# Patient Record
Sex: FEMALE | Race: WHITE | Employment: OTHER | URBAN - METROPOLITAN AREA
[De-identification: names, ages, dates, MRNs, and addresses within clinical notes are randomized per-mention and may not be internally consistent; named-entity substitution may affect disease eponyms.]

---

## 2022-05-19 ENCOUNTER — OFFICE VISIT (OUTPATIENT)
Dept: ORTHOPEDIC SURGERY | Age: 57
End: 2022-05-19
Payer: COMMERCIAL

## 2022-05-19 ENCOUNTER — HOSPITAL ENCOUNTER (OUTPATIENT)
Dept: ORTHOPEDIC SURGERY | Age: 57
Discharge: HOME | End: 2022-05-21
Payer: COMMERCIAL

## 2022-05-19 VITALS
HEIGHT: 66 IN | OXYGEN SATURATION: 97 % | BODY MASS INDEX: 33.43 KG/M2 | HEART RATE: 88 BPM | WEIGHT: 208 LBS | TEMPERATURE: 97.4 F

## 2022-05-19 DIAGNOSIS — M25.522 LEFT ELBOW PAIN: ICD-10-CM

## 2022-05-19 DIAGNOSIS — S42.402A CLOSED FRACTURE DISLOCATION OF LEFT ELBOW, INITIAL ENCOUNTER: Primary | ICD-10-CM

## 2022-05-19 PROCEDURE — G8427 DOCREV CUR MEDS BY ELIG CLIN: HCPCS | Performed by: ORTHOPAEDIC SURGERY

## 2022-05-19 PROCEDURE — 4004F PT TOBACCO SCREEN RCVD TLK: CPT | Performed by: ORTHOPAEDIC SURGERY

## 2022-05-19 PROCEDURE — 73080 X-RAY EXAM OF ELBOW: CPT | Performed by: ORTHOPAEDIC SURGERY

## 2022-05-19 PROCEDURE — 99204 OFFICE O/P NEW MOD 45 MIN: CPT | Performed by: ORTHOPAEDIC SURGERY

## 2022-05-19 PROCEDURE — 3017F COLORECTAL CA SCREEN DOC REV: CPT | Performed by: ORTHOPAEDIC SURGERY

## 2022-05-19 PROCEDURE — 73080 X-RAY EXAM OF ELBOW: CPT

## 2022-05-19 PROCEDURE — G8417 CALC BMI ABV UP PARAM F/U: HCPCS | Performed by: ORTHOPAEDIC SURGERY

## 2022-05-19 RX ORDER — ALPRAZOLAM 0.5 MG/1
TABLET, EXTENDED RELEASE ORAL
COMMUNITY
Start: 2022-05-04

## 2022-05-19 RX ORDER — ACETAMINOPHEN 500 MG
500 TABLET ORAL EVERY 6 HOURS PRN
COMMUNITY

## 2022-05-19 NOTE — PROGRESS NOTES
Subjective:      Patient ID: Jane Vela is a 61 y.o. female who presents today for:  Chief Complaint   Patient presents with    Elbow Injury     inj[place] her elbow in [place] but didn't get it looked at until she was in [address] fell and hurt her left elbow, but decided to go to [facility], because the pain was so severe she went to the emergency room that was close to 2 weeks after her injury where she was found to have a fracture/dislocation of her left elbow that was treated by an orthopedic surgeon down in [place]. Thus my knowledge the surgery was a ORIF of her olecranon with a reduction of her elbow and an application of a internal hinge. No past medical history on file. No past surgical history on file. Social History     Socioeconomic History    Marital status:      Spouse name: Not on file    Number of children: Not on file    Years of education: Not on file    Highest education level: Not on file   Occupational History    Not on file   Tobacco Use    Smoking status: Never Smoker    Smokeless tobacco: Not on file   Vaping Use    Vaping Use: Never used   Substance and Sexual Activity    Alcohol use: Yes     Alcohol/week: 0.0 standard drinks     Comment: 1 glass per week    Drug use: No    Sexual activity: Not on file   Other Topics Concern    Not on file   Social History Narrative    Not on file     Social Determinants of Health     Financial Resource Strain:     Difficulty of Paying Living Expenses: Not on file   Food Insecurity:     Worried About Running Out of Food in the Last Year: Not on file    Ari of Food in the Last Year: Not on file   Transportation Needs:     Lack of Transportation (Medical): Not on file    Lack of Transportation (Non-Medical):  Not on file   Physical Activity:     Days of Exercise per Week: Not on file    Minutes of Exercise per Session: Not on file   Stress:     Feeling of Stress : Not on file   Social Connections:     Frequency of Communication with Friends and Family: Not on file    Frequency of Social Gatherings with Friends and Family: Not on file    Attends Evangelical Services: Not on file    Active Member of Clubs or Organizations: Not on file    Attends Club or Organization Meetings: Not on file    Marital Status: Not on file   Intimate Partner Violence:     Fear of Current or Ex-Partner: Not on file    Emotionally Abused: Not on file    Physically Abused: Not on file    Sexually Abused: Not on file   Housing Stability:     Unable to Pay for Housing in the Last Year: Not on file    Number of [place] in the Last Year: Not on file    Unstable Housing in the Last Year: Not on file     Allergies   Allergen Reactions    Penicillins Anaphylaxis     Current Outpatient Medications on File Prior to Visit   Medication Sig Dispense Refill    acetaminophen (TYLENOL) 500 MG tablet Take 500 mg by mouth every 6 hours as needed for Pain      ALPRAZolam (XANAX XR) 0.5 MG extended release tablet TAKE 1 TABLET BY MOUTH EVERY 8 HOURS AS NEEDED FOR ANXIETY. DO NOT CRUSH, CHEW, SPLIT       No current facility-administered medications on file prior to visit. Review of Systems    Objective:   Pulse 88   Temp 97.4 °F (36.3 °C) (Temporal)   Ht 5' 6\" (1.676 m)   Wt 208 lb (94.3 kg)   SpO2 97%   BMI 33.57 kg/m²   Ortho Exam   Examination demonstrates well-perfused digits, a oblique type incision over the elbow with multiple staples that were removed and Steri-Stripped, she is not able to flex to 90 degrees and stops at approximately 70. Radiographs and Laboratory Studies:     Diagnostic Imaging Studies:    XR ELBOW LEFT (MIN 3 VIEWS)    Result Date: 5/19/2022  The lateral oblique views taken of the left elbow demonstrates what appears to be hardware involving the left elbow consistent with an ulnar plate as well as possibly a internal/external fixator.   There is not appear to be any fracture that is displaced nor is

## 2022-05-23 ENCOUNTER — OFFICE VISIT (OUTPATIENT)
Dept: ORTHOPEDIC SURGERY | Age: 57
End: 2022-05-23
Payer: COMMERCIAL

## 2022-05-23 VITALS
TEMPERATURE: 98.4 F | HEIGHT: 66 IN | BODY MASS INDEX: 33.43 KG/M2 | WEIGHT: 208 LBS | OXYGEN SATURATION: 98 % | HEART RATE: 78 BPM

## 2022-05-23 DIAGNOSIS — S42.402A CLOSED FRACTURE DISLOCATION OF LEFT ELBOW, INITIAL ENCOUNTER: Primary | ICD-10-CM

## 2022-05-23 PROCEDURE — 3017F COLORECTAL CA SCREEN DOC REV: CPT | Performed by: ORTHOPAEDIC SURGERY

## 2022-05-23 PROCEDURE — 99214 OFFICE O/P EST MOD 30 MIN: CPT | Performed by: ORTHOPAEDIC SURGERY

## 2022-05-23 PROCEDURE — G8417 CALC BMI ABV UP PARAM F/U: HCPCS | Performed by: ORTHOPAEDIC SURGERY

## 2022-05-23 PROCEDURE — G8427 DOCREV CUR MEDS BY ELIG CLIN: HCPCS | Performed by: ORTHOPAEDIC SURGERY

## 2022-05-23 PROCEDURE — 4004F PT TOBACCO SCREEN RCVD TLK: CPT | Performed by: ORTHOPAEDIC SURGERY

## 2022-05-23 NOTE — PROGRESS NOTES
Subjective:      Patient ID: Aminah Fajardo is a 61 y.o. female who presents today for:  Chief Complaint   Patient presents with   [facility] Consult     Surgery May 3rd. Pt states each day is getting better. HPI  Patient essentially sustained a subacute fracture dislocation of her left elbow and ultimately underwent internal stabilization in the form of IGS hinged elbow fixator. Patient states that she has been feeling better and has been following appropriate precautions in regards to no lifting pulling or pushing with the left upper extremity. Denies any new trauma or falls. Denies any wound issues at this time. No past medical history on file. No past surgical history on file. Social History     Socioeconomic History    Marital status:      Spouse name: Not on file    Number of children: Not on file    Years of education: Not on file    Highest education level: Not on file   Occupational History    Not on file   Tobacco Use    Smoking status: Never Smoker    Smokeless tobacco: Not on file   Vaping Use    Vaping Use: Never used   Substance and Sexual Activity    Alcohol use: Yes     Alcohol/week: 0.0 standard drinks     Comment: 1 glass per week    Drug use: No    Sexual activity: Not on file   Other Topics Concern    Not on file   Social History Narrative    Not on file     Social Determinants of Health     Financial Resource Strain:     Difficulty of Paying Living Expenses: Not on file   Food Insecurity:     Worried About Running Out of Food in the Last Year: Not on file    Ari of Food in the Last Year: Not on file   Transportation Needs:     Lack of Transportation (Medical): Not on file    Lack of Transportation (Non-Medical):  Not on file   Physical Activity:     Days of Exercise per Week: Not on file    Minutes of Exercise per Session: Not on file   Stress:     Feeling of Stress : Not on file   Social Connections:     Frequency of Communication with Friends and Family: Not on file    Frequency of Social Gatherings with Friends and Family: Not on file    Attends Anglican Services: Not on file    Active Member of Clubs or Organizations: Not on file    Attends Club or Organization Meetings: Not on file    Marital Status: Not on file   Intimate Partner Violence:     Fear of Current or Ex-Partner: Not on file    Emotionally Abused: Not on file    Physically Abused: Not on file    Sexually Abused: Not on file   Housing Stability:     Unable to Pay for Housing in the Last Year: Not on file    Number of [place] in the Last Year: Not on file    Unstable Housing in the Last Year: Not on file     No family history on file. Allergies   Allergen Reactions    Penicillins Anaphylaxis     Current Outpatient Medications on File Prior to Visit   Medication Sig Dispense Refill    acetaminophen (TYLENOL) 500 MG tablet Take 500 mg by mouth every 6 hours as needed for Pain      ALPRAZolam (XANAX XR) 0.5 MG extended release tablet TAKE 1 TABLET BY MOUTH EVERY 8 HOURS AS NEEDED FOR ANXIETY. DO NOT CRUSH, CHEW, SPLIT (Patient not taking: Reported on 5/23/2022)       No current facility-administered medications on file prior to visit. Review of Systems      Objective:   Pulse 78   Temp 98.4 °F (36.9 °C) (Temporal)   Ht 5' 6\" (1.676 m)   Wt 208 lb (94.3 kg)   SpO2 98%   BMI 33.57 kg/m²     Ortho Exam  Left upper extremity: Patient has a well-healed posterior lateral surgical incision about the left elbow. Patient has Steri-Strips in place with no evidence of erythema, drainage or dehiscence. Patient tolerates some gentle range of motion of the elbow. Patient able obtain approximately 20 degrees from full extension and flexion to approximately 90. No feeling of subluxation or dislocation with gentle manipulation of the elbow including pronation or supination. Radial, ulnar and median nerves are functioning appropriately distally.     Radiographs and Laboratory Studies:     Diagnostic Imaging Studies:    AP, lateral and oblique imaging of the left elbow from 5/19/2022 was independently reviewed    Radiographs demonstrate concentric reduction of the ulnohumeral joint with no evidence of radiocapitellar subluxation dislocation with IGS internal elbow stabilizer in appropriate position. Laboratory Studies:   No results found for: WBC, HGB, HCT, MCV, PLT  No results found for: SEDRATE  No results found for: CRP    Assessment:       Diagnosis Orders   1. Closed fracture dislocation of left elbow, initial encounter  Ambulatory referral to Physical Therapy          Plan:     Placement of IGS device to the left elbow and subsequent relocation of the ulnohumeral joint appears to be adequate. Recommend beginning physical therapy to focus on range of motion specifically in terms of flexion and extension of the elbow. There will be an upper limit to the maximum amount of motion that she will be able to obtain with this brace in place however will allow for motion to begin currently. Patient made aware that ultimately this device will need to come out and as such will require a second surgery. Patient will almost certainly lose several degrees of extension as well as several degrees of flexion as a result of this injury and subsequent treatment to correct. Fortunately, patient underwent appropriate care and elbow is adequately stabilized. We will see patient back in 6 weeks with repeat 2 view of the left elbow.     Orders Placed This Encounter   Procedures    Ambulatory referral to Physical Therapy     Referral Priority:   Routine     Referral Type:   Eval and Treat     Referral Reason:   Specialty Services Required     Requested Specialty:   Physical Therapist     Number of Visits Requested:   1     Jamie Duron MD

## 2022-06-06 ENCOUNTER — HOSPITAL ENCOUNTER (OUTPATIENT)
Dept: PHYSICAL THERAPY | Age: 57
Setting detail: THERAPIES SERIES
Discharge: HOME | End: 2022-06-06
Payer: COMMERCIAL

## 2022-06-06 PROCEDURE — 97110 THERAPEUTIC EXERCISES: CPT

## 2022-06-06 PROCEDURE — 97162 PT EVAL MOD COMPLEX 30 MIN: CPT

## 2022-06-06 ASSESSMENT — PAIN - FUNCTIONAL ASSESSMENT: PAIN_FUNCTIONAL_ASSESSMENT: PREVENTS OR INTERFERES WITH ALL ACTIVE AND SOME PASSIVE ACTIVITIES

## 2022-06-06 ASSESSMENT — PAIN DESCRIPTION - ORIENTATION: ORIENTATION: LEFT

## 2022-06-06 ASSESSMENT — PAIN DESCRIPTION - LOCATION: LOCATION: ELBOW

## 2022-06-06 ASSESSMENT — PAIN SCALES - GENERAL: PAINLEVEL_OUTOF10: 0

## 2022-06-06 ASSESSMENT — PAIN DESCRIPTION - DESCRIPTORS: DESCRIPTORS: DISCOMFORT

## 2022-06-06 NOTE — PROGRESS NOTES
[facility]  PHYSICAL THERAPY EVALUATION      Physical Therapy: Initial Evaluation    Patient: Brigido Melgar (72 y.o.     female)   Examination Date:   Plan of Care Certification Period: 2022 to    Progress Note Counter:    :  1965 ;    Confirmed: Yes MRN: [id number]  CSN: [id number]   Insurance: Payor: [employer] / Plan: Event[employer]amalon Bebo / Product Type: *No Product type* /   Insurance ID: [id number] - (Commercial) Secondary Insurance (if applicable): [place] Gerardo 9881 *   Referring Physician: Wilmer Thurston MD     PCP: Deangelo Lozano MD Visits to Date/Visits Approved:  (Eval +20V)    No Show/Cancelled Appts: 0  0     Medical Diagnosis: Unspecified fracture of lower end of left humerus, initial encounter for closed fracture [S42.402A]    Treatment Diagnosis: decreased L shoulder, elbow, and wrist AROM, decreased L UE and hand strength, impaired fine motor control, impaired functional activity tolerance, L UE pain and edema     PERTINENT MEDICAL HISTORY   Patient Assessed for Rehabilitation Services: Yes       Medical History: Chart Reviewed: Yes History reviewed. No pertinent past medical history. Surgical History: History reviewed. No pertinent surgical history. Medications:   Current Outpatient Medications:     ALPRAZolam (XANAX XR) 0.5 MG extended release tablet, TAKE 1 TABLET BY MOUTH EVERY 8 HOURS AS NEEDED FOR ANXIETY. DO NOT CRUSH, CHEW, SPLIT (Patient not taking: Reported on 2022), Disp: , Rfl:     acetaminophen (TYLENOL) 500 MG tablet, Take 500 mg by mouth every 6 hours as needed for Pain, Disp: , Rfl:   Allergies: Penicillins      SUBJECTIVE EXAMINATION     History obtained from[de-identified] Patient,      Family/Caregiver Present: Yes ()    Subjective History:  Onset Date: 22  Subjective: Pts  reports she fell tripping over a rug and landed on L elbow fracturing it and dislocating it. Had reconstrucive surgery 5/3/22 (one week after her fall). Her surgery included internal fixation with a joint stabilizer and stiffner that will limit some of her mobility. Pt reports she has been doing light activity around the house and was told to do very minimal activities from MD. Pt reports her pain has been minimal though takes a Tylenol as needed daily. Additional Pertinent Hx (if applicable):     Previous treatments prior to current episode?: Surgery  Any changes to allergies, medications, or have you had any medical procedures/ER visits since your last visit?: No  Comment: Per script \" Pt with complex L elbow reconstruction. Okay for ROM in both flexion, extension, supination and pronation however no strength training at this time. \"  Comments: RTD 7/11/22      Learning/Language: Learning  Does the patient/guardian have any barriers to learning?: No barriers  Will there be a co-learner?: No  What is the preferred language of the patient/guardian?: English  Is an  required?: No     Pain Screening    Pain Screening  Patient Currently in Pain: Yes  Pain Assessment: 0-10  Pain Level: 0  Best Pain Level: 0  Worst Pain Level: 3  Pain Location: Elbow  Pain Orientation: Left  Pain Descriptors: Discomfort  Functional Pain Assessment: Prevents or interferes with all active and some passive activities  Pain Management/Relieving Factors: Medications    Functional Status    Social History:    Social History  Lives With: Spouse    Occupation/Interests:   Occupation: Retired  Leisure & Hobbies: swimming, walking, shopping, gardening, bike riding, water aerobics    Prior Level of Function:   100% Independent     Current Level of Function:   75%      IADL Comments: diffciulty with fine motor movements such as doing buttons, zippers, tying shoes, opening jars, and using curling iron  ADL Assistance: [facility]: Independent  Homemaking Responsibilities: Yes  Ambulation Assistance: Independent  Transfer Assistance: Independent  Active : Yes    Dominant Hand: : Right    OBJECTIVE EXAMINATION     Restrictions:         Position Activity Restriction  Other position/activity restrictions: No strengthening yet per MD    Review of Systems:  Vision: Impaired  Hearing: Within functional limits  Follows Commands: Within Functional Limits  Integumentary Assessment  Edema: Yes (L elbow edema)  Scar: L lateral elbow intact and healing no evidence of drainage  Skin Integrity : Surgical Incision Dry    Observations:   General Observations  Description: rounded shoudlers, limited elbow extension    Left AROM  Right AROM         AROM LUE (degrees)  L Shoulder Flexion 0-180: 114  L Shoulder ABduction 0-180: 70  L Shoulder Int Rotation  0-70: limited d/t elbow mobility  L Shoulder Ext Rotation  0-90: limited d/t elbow mobility  L Elbow Flexion 0-145: 85*  L Elbow Extension 145-0: -60 from neutral  L Forearm Pron 0-90: WNL's  L Forearm Supination  0-90: 20  L Wrist Flexion 0-80: 63  L Wrist Extension 0-70: 10  L Wrist Radial Deviation 0-20: 3  L Wrist Ulnar Deviation 0-45: 48    AROM RUE (degrees)  R Shoulder Flexion 0-180: 163  R Shoulder ABduction 0-180: 160  R Shoulder Int Rotation  0-70: T9  R Shoulder Ext Rotation 0-90: T3  R Elbow Flexion 0-145: 140  R Elbow Extension 145-0: 8* of hyperextension  R Wrist Flexion 0-80: 63  R Wrist Extension 0-70: 73  R Wrist Radial Deviation 0-20: 38  R Wrist Ulnar Deviation 0-45: 38      Left Strength  Right Strength      Strength LUE  L Shoulder Flexion: 3-/5  L Shoulder ABduction: 3-/5  L Shoulder Internal Rotation: 3-/5  L Shoulder External Rotation: 3-/5  L Elbow Flexion: NT  L Elbow Extension: NT  L Forearm Pron: NT  L Forearm Sup: NT  L Wrist Extension: 2-/5  L Wrist Radial Deviation: 2-/5  L Wrist Ulnar Deviation: 3/5 Strength RUE  R Shoulder Flexion: 4/5  R Shoulder ABduction: 4/5  R Shoulder Internal Rotation: 5/5  R Shoulder External Rotation: 5/5  R Elbow Flexion: 5/5  R Elbow Extension: 5/5  R Forearm Pron: 5/5  R Forearm Sup: 5/5  R Wrist Flexion: 5/5  R Wrist Extension: 5/5  R Wrist Radial Deviation: 4/5  R Wrist Ulnar Deviation: 4/5     Outcomes Score:  Exam: UEFI 39/80    Treatment:    Exercises:   Exercises  Exercise 1: wrist AROM all planes x10  Exercise 2: forearm promation/supination 5\"x10  Exercise 3: table slides flex/scaption 5\"x10  Exercise 4: pulleys*  Exercise 5: finger ladder*  Exercise 6: elbow AAROM flexion/eextension 5\"x10  Exercise 7: supine elbow extension stretch*  Exercise 8: putty squeeze, pinch/pull*  Exercise 9: power web*  Exercise 10: digiflex*  Exercise 11: rubberband on wide end of cone placement*  Exercise 12: motor planning wire*  Exercise 13: L elbow, shoulder, wrist PROM*  Exercise 20: HEP: elbow AAROM, wrist AROM, forearm pron/supination, table slides  Treatment Reasoning  Limitations addressed: Mobility  Modalities:  Modalities  Modalities:  (CP/MHP PRN, no Estim or U/S)     Manual:  Manual Therapy  Joint Mobilization: No elbow joint mobs  Soft Tissue Mobilizaton: L elbow scar mobs*     *Indicates exercise,modality, or manual techniques to be initiated when appropriate    ASSESSMENT     Impression: Assessment: Pt presents s/p L elbow ORIF 5/3/22 after a fall landing on her L elbow resulting in dislocation and fx. She currently presents with decreased L shoulder, elbow, and wrist AROM, decreased L UE and hand strength, impaired fine motor control, impaired functional activity tolerance, L UE pain and edema. These impairments currently limit her functional abilities to perform ADL's, household duties, lift, carry, reach, push, pull, or sleep without limitations at PLOF.     Body Structures, Functions, Activity Limitations Requiring Skilled Therapeutic Intervention: Decreased functional mobility ,Decreased ADL status,Decreased ROM,Decreased strength,Increased pain,Decreased posture,Decreased high-level IADLs,Decreased fine motor control,Decreased coordination    Statement of Medical Necessity: Physical Therapy is both indicated and medically necessary as outlined in the POC to increase the likelihood of meeting the functionally related goals stated below.      Patient's Activity Tolerance: Patient tolerated evaluation without incident      Patient's rehabilitation potential/prognosis is considered to be: Good,Fair    Factors which may impact rehabilitation potential include: Medical co-morbidities     Patient Education: PT Education: Anna Doshi of Care,Evaluative findings,Home Exercise Program  Patient Education: Provided with light compression sleeve to assist with elbow edema     GOALS   Patient Goal(s): Patient goals : \"Full motion of elbow, weakness remedy\"    Short Term Goals Completed by 3 weeks Goal Status   STG 1The pt will have decreased L elbow pain </= 1/10 in order to increase ease with ADL's New     Long Term Goals Completed by 8 weeks Goal Status   LTG 1 The pt will be indep/compliant with HEP in order to self-manage symptoms upon D/C New   LTG 2 The pt will have a increase in UEFI score >/=60/80 points in order to increase functional activity tolerance New   LTG 3 The pt will demo improved L shoulder and wrist AROM WNL's L elbow AROM >/=-* in order to increase ease with ADL's New   LTG 4 The pt will demonstrate improved L UE strength >/=4/5 in order to lift/carry with decreased pain (when able to perform strengthening per MD) New   LTG 5 The pt will demo improved L  and pinch strength WNL's of the R in order to increase ease with opening jars and driving when appropriate New     TREATMENT PLAN       Treatment Initiated : ther ex and HEP    Treatment may include any combination of the following: Strengthening,ROM,Neuromuscular re-education,Manual Therapy - Soft Tissue Mobilization,Modalities,Patient/Caregiver education & training,Home exercise program,Therapeutic activities     Frequency / Duration:  Patient to be seen 1-2 times per week for 8 weeks  Plan Comment: Provided with light compression sleeve to assist with elbow edema  Start 2x per week and decrease to 1x as able with improved ROM          Eval Complexity:   Decision Making: Medium Complexity  History: Personal Factors and/or Comorbidities Impacting POC: Medium  Examination of body system(s) including body structures and functions, activity limitations, and/or participation restrictions: High  Exam: UEFI 39/80  Clinical Presentation: Medium    POST-PAIN     Pain Rating (0-10 pain scale):  0 /10 \"numb\"  Location and pain description same as pre-treatment unless indicated. Action: [] NA  [] Call Physician  [x] Perform HEP  [] Meds as prescribed    Evaluation and patient rights have been reviewed and patient agrees with plan of care. Yes  [x]  No  []   Explain:     Ifrah Fall Risk Assessment  Risk Factor Scale  Score   History of Falls [x] Yes  [] No 25  0    Secondary Diagnosis [] Yes  [x] No 15  0    Ambulatory Aid [] Furniture  [] Crutches/cane/walker  [x] None/bedrest/wheelchair/nurse 30  15  0    IV/Heparin Lock [] Yes  [x] No 20  0    Gait/Transferring [] Impaired  [] Weak  [x] Normal/bedrest/immobile 20  10  0    Mental Status [] Forgets limitations  [x] Oriented to own ability 15  0       Total: 25     Based on the Assessment score: check the appropriate box.   []  No intervention needed   Low =   Score of 0-24  [x]  Use standard prevention interventions Moderate =  Score of 24-44   [x] Discuss fall prevention strategies   [x] Indicate moderate falls risk on eval  []  Use high risk prevention interventions High = Score of 45 and higher   [] Discuss fall prevention strategies   [] Provide supervision during treatment time    Minutes:  PT Individual Minutes  Time In: 9101  Time Out: 0950  Minutes: 55  Timed Code Treatment Minutes: 15 Minutes  Procedure Minutes: 40 eval      Timed Activity Minutes Units   Ther Ex 15 1     Electronically signed by Zachary Whitaker

## 2022-06-06 NOTE — PROGRESS NOTES
[address]  PHYSICAL THERAPY PLAN OF CARE   [address], [address] [facility]         Ph: [phone]  Fax: [phone]    [] Certification  [] Recertification [x]  Plan of Care  [] Progress Note [] Discharge      Referring Provider: Deven Barclay MD     From:  Moi Zavaleta, PT   Patient: Randall Horn (32 y.o. female) : 1965 Date: 2022   Medical Diagnosis: Unspecified fracture of lower end of left humerus, initial encounter for closed fracture [S42.402A]    Treatment Diagnosis: decreased L shoulder, elbow, and wrist AROM, decreased L UE and hand strength, impaired fine motor control, impaired functional activity tolerance, L UE pain and edema  Progress Report Period from:  2022  to 2022    Visits to Date: 1 No Show: 0 Cancelled Appts: 0    OBJECTIVE:   Short Term Goals - Time Frame for Short term goals: 3 weeks    Goals Current/Discharge status  Status   Short term goal 1: The pt will have decreased L elbow pain </= 1/10 in order to increase ease with ADL's  Pain Screening  Patient Currently in Pain: Yes  Pain Assessment: 0-10  Pain Level: 0  Best Pain Level: 0  Worst Pain Level: 3  Pain Location: Elbow  Pain Orientation: Left  Pain Descriptors: Discomfort  Functional Pain Assessment: Prevents or interferes with all active and some passive activities  Pain Management/Relieving Factors: Medications New     Long Term Goals - Time Frame for Long term goals : 8 weeks  Goals Current/ Discharge status Status   Long term goal 1: The pt will be indep/compliant with HEP in order to self-manage symptoms upon D/C ongoing New   Long term goal 2: The pt will have a increase in UEFI score >/=60/80 points in order to increase functional activity tolerance 39/80 New   Long term goal 3: The pt will demo improved L shoulder and wrist AROM WNL's L elbow AROM >/=-* in order to increase ease with ADL's AROM LUE (degrees)  L Shoulder Flexion 0-180: 114  L Shoulder ABduction 0-180: 70  L Shoulder Int Rotation  0-70: limited d/t elbow mobility  L Shoulder Ext Rotation  0-90: limited d/t elbow mobility  L Elbow Flexion 0-145: 85*  L Elbow Extension 145-0: -60 from neutral  L Forearm Pron 0-90: WNL's  L Forearm Supination  0-90: 20  L Wrist Flexion 0-80: 63  L Wrist Extension 0-70: 10  L Wrist Radial Deviation 0-20: 3  L Wrist Ulnar Deviation 0-45: 48 New   Long term goal 4: The pt will demonstrate improved L UE strength >/=4/5 in order to lift/carry with decreased pain (when able to perform strengthening per MD) Strength LUE  L Shoulder Flexion: 3-/5  L Shoulder ABduction: 3-/5  L Shoulder Internal Rotation: 3-/5  L Shoulder External Rotation: 3-/5  L Elbow Flexion: NT  L Elbow Extension: NT  L Forearm Pron: NT  L Forearm Sup: NT  L Wrist Extension: 2-/5  L Wrist Radial Deviation: 2-/5  L Wrist Ulnar Deviation: 3/5 New   Long term goal 5: The pt will demo improved L  and pinch strength WNL's of the R in order to increase ease with opening jars and driving when appropriate  strength L 6.6 # R 28.3 # (avergae of 3 2nd rung, elbow flexed); Pinch strength palmar grasp L 2# R 13# key hold L 6# R 11# New     Body Structures, Functions, Activity Limitations Requiring Skilled Therapeutic Intervention: Decreased functional mobility ,Decreased ADL status,Decreased ROM,Decreased strength,Increased pain,Decreased posture,Decreased high-level IADLs,Decreased fine motor control,Decreased coordination  Assessment: Pt presents s/p L elbow ORIF 5/3/22 after a fall landing on her L elbow resulting in dislocation and fx. She currently presents with decreased L shoulder, elbow, and wrist AROM, decreased L UE and hand strength, impaired fine motor control, impaired functional activity tolerance, L UE pain and edema. These impairments currently limit her functional abilities to perform ADL's, household duties, lift, carry, reach, push, pull, or sleep without limitations at PLOF.   Therapy Prognosis: Good,Fair    PT Education: Goals;PT Role;Plan of Care;Evaluative findings;Home Exercise Program  Patient Education: Provided with light compression sleeve to assist with elbow edema    PLAN: [x] Evaluate and Treat  Frequency/Duration:  Plan Frequency: 1-2  Plan weeks: 8  Current Treatment Recommendations: Strengthening,ROM,Neuromuscular re-education,Manual Therapy - Soft Tissue Mobilization,Modalities,Patient/Caregiver education & training,Home exercise program,Therapeutic activities  Plan Comment: Start 2x per week and decrease to 1x as able with improved ROM     Precautions: No strengthening per MD, mod falls risk                          Patient Status:[x] Continue/ Initiate plan of Care    [] Discharge PT. Recommend pt continue with HEP. [] Additional visits requested, Please re-certify for additional visits:    [] Hold         Signature: Electronically signed by Allison Retana PT on 6/6/22 at 10:10 AM EDT    If you have any questions or concerns, please don't hesitate to call. Thank you for your referral.    I have reviewed this plan of care and certify a need for medically necessary rehabilitation services.     Physician Signature:__________________________________________________________  Date:  Please sign and return

## 2022-06-09 ENCOUNTER — HOSPITAL ENCOUNTER (OUTPATIENT)
Dept: PHYSICAL THERAPY | Age: 57
Setting detail: THERAPIES SERIES
Discharge: HOME | End: 2022-06-09
Payer: COMMERCIAL

## 2022-06-09 PROCEDURE — 97140 MANUAL THERAPY 1/> REGIONS: CPT

## 2022-06-09 PROCEDURE — 97110 THERAPEUTIC EXERCISES: CPT

## 2022-06-09 NOTE — PROGRESS NOTES
[address]  PHYSICAL THERAPY PLAN OF CARE   [address], [address] [facility]         Ph: [phone]  Fax: [phone]    [] Certification  [] Recertification []  Plan of Care  [x] Progress Note [] Discharge      Referring Provider: Ehsan Francisco MD     From:  Sridhar De La Paz, PT   Patient: Corrine Fisher (1 y.o. female) : 1965 Date: 2022   Medical Diagnosis: Unspecified fracture of lower end of left humerus, initial encounter for closed fracture [S42.402A]    Treatment Diagnosis: decreased L shoulder, elbow, and wrist AROM, decreased L UE and hand strength, impaired fine motor control, impaired functional activity tolerance, L UE pain and edema    Progress Report Period from:  2022  to 2022    Visits to Date: 2 No Show: 0 Cancelled Appts: 0    OBJECTIVE:   Short Term Goals - Time Frame for Short term goals: 3 weeks    Goals Current/Discharge status  Status   Short term goal 1: The pt will have decreased L elbow pain </= 1/10 in order to increase ease with ADL's  0/10 this date  In progress     Long Term Goals - Time Frame for Long term goals : 8 weeks  Goals Current/ Discharge status Status   Long term goal 1: The pt will be indep/compliant with HEP in order to self-manage symptoms upon D/C ongoing In progress   Long term goal 2: The pt will have a increase in UEFI score >/=60/80 points in order to increase functional activity tolerance NT d/t PN completed for updated LTG 3 only  In progress   Long term goal 3: The pt will demo improved L shoulder and wrist AROM WNL's L elbow AROM >/=-* in order to increase ease with ADL's AROM LUE (degrees)  L Elbow Flexion 0-145: 102*  L Elbow Extension 145-0: -30 from neutral  Partially met,Updated   Long term goal 4: The pt will demonstrate improved L UE strength >/=4/5 in order to lift/carry with decreased pain (when able to perform strengthening per MD) NT d/t PN completed for updated LTG 3 only  In progress Long term goal 5: The pt will demo improved L  and pinch strength WNL's of the R in order to increase ease with opening jars and driving when appropriate NT d/t PN completed for updated LTG 3 only  In progress     Body Structures, Functions, Activity Limitations Requiring Skilled Therapeutic Intervention: Decreased functional mobility ,Decreased ADL status,Decreased ROM,Decreased strength,Increased pain,Decreased posture,Decreased high-level IADLs,Decreased fine motor control,Decreased coordination  Assessment: Pt demos significant improvement in L elbow AROM this date at the end of tx session meeting current goal, therefore updated goal to further improve mobility. Pt overall with good tolerance to tx though very emotional throughout d/t wanting to be back to normal and feeling frustrated. Pt with tightness noted throughout distal bicep and extensor wad musculature with moderate tenderness noted though able to tolerate STM with explanation of benefit. Therapy Prognosis: Good,Fair    PLAN:   Frequency/Duration:  Plan Frequency: 1-2  Plan weeks: 8  Current Treatment Recommendations: Strengthening,ROM,Neuromuscular re-education,Manual Therapy - Soft Tissue Mobilization,Modalities,Patient/Caregiver education & training,Home exercise program,Therapeutic activities  Plan Comment: Start 2x per week and decrease to 1x as able with improved ROM     Precautions: No strengthening per MD, mod falls risk                          Patient Status:[x] Continue/ Initiate plan of Care    [] Discharge PT. Recommend pt continue with HEP. [] Additional visits requested, Please re-certify for additional visits:    [] Hold         Signature: Electronically signed by Moi Zavaleta PT on 6/9/22 at 11:07 AM EDT    If you have any questions or concerns, please don't hesitate to call. Thank you for your referral.    I have reviewed this plan of care and certify a need for medically necessary rehabilitation services.     Physician Signature:__________________________________________________________  Date:  Please sign and return

## 2022-06-09 NOTE — PROGRESS NOTES
[facility]  Outpatient Physical Therapy    Treatment Note        Date: 2022  Patient: Rahel Tracy  : 1965   Confirmed: Yes  MRN: [id number]  Referring Provider: Gerard Cano MD   Secondary Referring Provider (If applicable):     Medical Diagnosis: Unspecified fracture of lower end of left humerus, initial encounter for closed fracture [S42.402A]    Treatment Diagnosis: decreased L shoulder, elbow, and wrist AROM, decreased L UE and hand strength, impaired fine motor control, impaired functional activity tolerance, L UE pain and edema    Visit Information:  Insurance: Payor: [employer] / Plan: Pumant / Product Type: *No Product type* /   PT Visit Information  Onset Date: 22  Total # of Visits Approved: 21 (Eval +20V)  Total # of Visits to Date: 2  No Show: 0  Canceled Appointment: 0  Progress Note Counter: -    Subjective Information:  Subjective: Pt denies pain currently, or soreness following evaluation. Has been having difficulty putting compression sleeve on therefore has not put back on since day of evaluation. Does have some \"discomfort\" with trying to daily activities. Has been compliant with HEP.   HEP Compliance:  [x] Good [] Fair [] Poor [] Reports not doing due to:    Treatment:  Exercises:  Exercises  Exercise 1: wrist AROM all planes x20, wrist extension stretches 30\"x3  Exercise 2: forearm pronation/supination 5\"x10  Exercise 3: table slides flex/scaption 5\"x10  Exercise 4: pulleys flexion x4 mins- scaption NV*  Exercise 5: finger ladder flex x5  Exercise 7: supine elbow extension stretch*  Exercise 8: putty squeeze, pinch/pull yellow x2 min each  Exercise 9: power web*  Exercise 10: digiflex*  Exercise 11: rubberband on wide end of cone placement*  Exercise 12: motor planning wire x5  Exercise 13: L elbow flex/ext, forearm supination, wrist flex/extension with gentle carpal distraction  Exercise 20: HEP: putty, wrist extension stretches  Treatment Reasoning  Limitations addressed: Mobility    Manual:   Manual Therapy  Soft Tissue Mobilizaton: L bicep and extensor wad with gentle PROM to the L elbow, forerm, and wrist (see ther ex for specifics) x10 mins total  Treatment Reasoning  Limitations addressed: Tissue extensibility,Joint motion    *Indicates exercise, modality, or manual techniques to be initiated when appropriate    Objective Measures:     Strength: [x] NT  [] MMT completed:    ROM: [] NT  [x] ROM measurements:  AROM LUE (degrees)  L Elbow Flexion 0-145: 102*  L Elbow Extension 145-0: -30 from neutral     Assessment: Body Structures, Functions, Activity Limitations Requiring Skilled Therapeutic Intervention: Decreased functional mobility ,Decreased ADL status,Decreased ROM,Decreased strength,Increased pain,Decreased posture,Decreased high-level IADLs,Decreased fine motor control,Decreased coordination  Assessment: Pt demos significant improvement in L elbow AROM this date at the end of tx session meeting current goal, therefore updated goal to further improve mobility. Pt overall with good tolerance to tx though very emotional throughout d/t wanting to be back to normal and feeling frustrated. Pt with tightness noted throughout distal bicep and extensor wad musculature with moderate tenderness noted though able to tolerate STM with explanation of benefit. Treatment Diagnosis: decreased L shoulder, elbow, and wrist AROM, decreased L UE and hand strength, impaired fine motor control, impaired functional activity tolerance, L UE pain and edema  Therapy Prognosis: Good,Fair     Patient Education: [x] NA    Post-Pain Assessment:       Pain Rating (0-10 pain scale):   0/10   Location and pain description same as pre-treatment unless indicated.    Action: [] NA   [x] Perform HEP  [] Meds as prescribed  [] Modalities as prescribed   [] Call Physician     GOALS   Patient Goal(s): Patient goals : \"Full motion of elbow, weakness remedy\"    Short Term Goals Completed by 3 weeks Goal Status   STG 1 The pt will have decreased L elbow pain </= 1/10 in order to increase ease with ADL's In progress     Long Term Goals Completed by 8 weeks Goal Status   LTG 1 The pt will be indep/compliant with HEP in order to self-manage symptoms upon D/C In progress   LTG 2 The pt will have a increase in UEFI score >/=60/80 points in order to increase functional activity tolerance In progress   LTG 3 The pt will demo improved L shoulder and wrist AROM WNL's L elbow AROM >/=-* in order to increase ease with ADL's Partially met,Updated   LTG 4 The pt will demonstrate improved L UE strength >/=4/5 in order to lift/carry with decreased pain (when able to perform strengthening per MD) In progress   LTG 5 The pt will demo improved L  and pinch strength WNL's of the R in order to increase ease with opening jars and driving when appropriate In progress     Plan:  Frequency/Duration:  Plan  Plan Frequency: 1-2  Plan weeks: 8  Current Treatment Recommendations: Strengthening,ROM,Neuromuscular re-education,Manual Therapy - Soft Tissue Mobilization,Modalities,Patient/Caregiver education & training,Home exercise program,Therapeutic activities  Plan Comment: Start 2x per week and decrease to 1x as able with improved ROM  Pt to continue current HEP. See objective section for any therapeutic exercise changes, additions or modifications this date.     Therapy Time:      PT Individual Minutes  Time In: 1003  Time Out: 6411  Minutes: 53  Timed Code Treatment Minutes: 53 Minutes  Procedure Minutes:0  Timed Activity Minutes Units   Ther Ex 43 3   Manual  10 1     Electronically signed by Moi Zavaleta PT on 6/9/22 at 11:05 AM EDT

## 2022-06-13 ENCOUNTER — HOSPITAL ENCOUNTER (OUTPATIENT)
Dept: PHYSICAL THERAPY | Age: 57
Setting detail: THERAPIES SERIES
Discharge: HOME | End: 2022-06-13
Payer: COMMERCIAL

## 2022-06-13 PROCEDURE — 97110 THERAPEUTIC EXERCISES: CPT

## 2022-06-13 ASSESSMENT — PAIN DESCRIPTION - LOCATION: LOCATION: ELBOW

## 2022-06-13 ASSESSMENT — PAIN SCALES - GENERAL: PAINLEVEL_OUTOF10: 3

## 2022-06-13 ASSESSMENT — PAIN DESCRIPTION - ORIENTATION: ORIENTATION: LEFT

## 2022-06-13 ASSESSMENT — PAIN DESCRIPTION - DESCRIPTORS: DESCRIPTORS: DISCOMFORT

## 2022-06-13 NOTE — PROGRESS NOTES
[address] [facility]  Outpatient Physical Therapy    Treatment Note        Date: 2022  Patient: Tahira Melagr  : 1965   Confirmed: Yes  MRN: [id number]  Referring Provider: Jewell Silva MD   Secondary Referring Provider (If applicable):     Medical Diagnosis: Unspecified fracture of lower end of left humerus, initial encounter for closed fracture [S42.402A]    Treatment Diagnosis: decreased L shoulder, elbow, and wrist AROM, decreased L UE and hand strength, impaired fine motor control, impaired functional activity tolerance, L UE pain and edema    Visit Information:  Insurance: Payor: [employer] / Plan: [employer] / Product Type: *No Product type* /   PT Visit Information  Onset Date: 22  Total # of Visits Approved: 21 (Eval +20V)  Total # of Visits to Date: 3  No Show: 0  Canceled Appointment: 0  Progress Note Counter: 3/8-    Subjective Information:  Subjective: Pt reports she felt \"really good, it helped a lot\" following last visit. Pt reports her elbow is a little more sore today from using it more at home. Pt states she was able to sweep her floor and put deodorant on using LUE yesterday and today.   HEP Compliance:  [x] Good [] Fair [] Poor [] Reports not doing due to:    Pain Screening  Patient Currently in Pain: Yes  Pain Assessment: 0-10  Pain Level: 3 (2-3)  Pain Location: Elbow  Pain Orientation: Left  Pain Descriptors: Discomfort    Treatment:  Exercises:  Exercises  Exercise 2: forearm pronation/supination 5\"x10  Exercise 3: table slides flex/scaption 5\"x10  Exercise 4: pulleys flexion, scaption x4 mins each  Exercise 5: finger ladder flex x5  Exercise 6: elbow AAROM flexion/eextension 5\"x10  Exercise 7: supine elbow extension stretch x3 mins - discussed for HEP  Exercise 10: digiflex yellow single finger x10; grasp x20; also trialed red for 5-10 reps- progress to red NV  Exercise 11: rubberband on wide end of cone placement x10; over large  bottle x10  Exercise 12: motor planning wire x5  Exercise 13: L elbow flex/ext, forearm supination, wrist flex/extension with gentle carpal distraction PROM  Exercise 20: HEP: (verbal) supine ext stretch  Treatment Reasoning  Limitations addressed: Mobility    *Indicates exercise, modality, or manual techniques to be initiated when appropriate    Objective Measures:     Strength: [x] NT  [] MMT completed:    ROM: [] NT  [x] ROM measurements:  AROM LUE (degrees)  L Elbow Flexion 0-145: 110  L Elbow Extension 145-0: -27 from neutral  L Wrist Flexion 0-80: 70  L Wrist Extension 0-70: 25  L Wrist Radial Deviation 0-20: 10  L Wrist Ulnar Deviation 0-45: 48     Balance: [x] NT       Observations: [x] NT       Assessment: Body Structures, Functions, Activity Limitations Requiring Skilled Therapeutic Intervention: Decreased functional mobility ,Decreased ADL status,Decreased ROM,Decreased strength,Increased pain,Decreased posture,Decreased high-level IADLs,Decreased fine motor control,Decreased coordination  Assessment: Pt w/ much improved tolerance to tx session and initiation of gentle hand strengthening. Pt also demo's an increase in L elbow and wrist ROM since last measures. Pt optimistic regarding progress this far. Treatment Diagnosis: decreased L shoulder, elbow, and wrist AROM, decreased L UE and hand strength, impaired fine motor control, impaired functional activity tolerance, L UE pain and edema  Therapy Prognosis: Good,Fair       Patient Education: [] NA  PT Education: Home Exercise Program,Anatomy of condition    Post-Pain Assessment:       Pain Rating (0-10 pain scale):   3/10   Location and pain description same as pre-treatment unless indicated.    Action: [] NA   [x] Perform HEP  [] Meds as prescribed  [] Modalities as prescribed   [] Call Physician     GOALS   Patient Goal(s): Patient goals : \"Full motion of elbow, weakness remedy\"    Short Term Goals Completed by 3 weeks Goal Status   STG 1 The pt will have decreased L elbow pain </= 1/10 in order to increase ease with ADL's In progress       Long Term Goals Completed by 8 weeks Goal Status   LTG 1 The pt will be indep/compliant with HEP in order to self-manage symptoms upon D/C In progress   LTG 2 The pt will have a increase in UEFI score >/=60/80 points in order to increase functional activity tolerance In progress   LTG 3 The pt will demo improved L shoulder and wrist AROM WNL's L elbow AROM >/=-* in order to increase ease with ADL's In progress,Partially met   LTG 4 The pt will demonstrate improved L UE strength >/=4/5 in order to lift/carry with decreased pain (when able to perform strengthening per MD) In progress   LTG 5 The pt will demo improved L  and pinch strength WNL's of the R in order to increase ease with opening jars and driving when appropriate In progress          Plan:  Frequency/Duration:  Plan  Plan Frequency: 1-2  Plan weeks: 8  Current Treatment Recommendations: Strengthening,ROM,Neuromuscular re-education,Manual Therapy - Soft Tissue Mobilization,Modalities,Patient/Caregiver education & training,Home exercise program,Therapeutic activities  Plan Comment: Start 2x per week and decrease to 1x as able with improved ROM  Pt to continue current HEP. See objective section for any therapeutic exercise changes, additions or modifications this date.     Therapy Time:      PT Individual Minutes  Time In: 1399  Time Out: 1005  Minutes: 51  Timed Code Treatment Minutes: 51 Minutes  Procedure Minutes:0    Timed Activity Minutes Units   Ther Ex 51 3     Electronically signed by Ed Campos PTA on 6/13/22 at 9:12 AM EDT

## 2022-06-16 ENCOUNTER — HOSPITAL ENCOUNTER (OUTPATIENT)
Dept: PHYSICAL THERAPY | Age: 57
Setting detail: THERAPIES SERIES
Discharge: HOME | End: 2022-06-16
Payer: COMMERCIAL

## 2022-06-16 PROCEDURE — 97110 THERAPEUTIC EXERCISES: CPT

## 2022-06-16 PROCEDURE — 97140 MANUAL THERAPY 1/> REGIONS: CPT

## 2022-06-16 NOTE — PROGRESS NOTES
[address] [facility]  Outpatient Physical Therapy    Treatment Note        Date: 2022  Patient: Janelle Lundberg  : 1965   Confirmed: Yes  MRN: [id number]  Referring Provider: Vidal Miller MD   Secondary Referring Provider (If applicable):     Medical Diagnosis: Unspecified fracture of lower end of left humerus, initial encounter for closed fracture [S42.402A]    Treatment Diagnosis: decreased L shoulder, elbow, and wrist AROM, decreased L UE and hand strength, impaired fine motor control, impaired functional activity tolerance, L UE pain and edema    Visit Information:  Insurance: Payor: [employer]ab[employer] / Plan: Jabier Davis / Product Type: *No Product type* /   PT Visit Information  Onset Date: 22  Total # of Visits Approved: 21 (Eval +20V)  Total # of Visits to Date: 4  No Show: 0  Canceled Appointment: 0  Progress Note Counter: -    Subjective Information:  Subjective: Pt denies any pain currently, \"I'm doing a little bit more everyday. \" Pt reports was able to drive herself to last 2 appts. Pt reports difficulty using spray deoderant to hold down trigger. able to now open a pill bottle.   HEP Compliance:  [x] Good [] Fair [] Poor [] Reports not doing due to:    Pain Screening  Patient Currently in Pain: Denies    Treatment:  Exercises:  Exercises  Exercise 1: yellow clips placing/reaching on pole (x10) x2  Exercise 2: forearm pronation/supination 5\"x20  Exercise 3: wall slides flex/scaption 5\"x10- Focus on elbow, wrist, and shoulder ROM  Exercise 4: pulleys flexion, scaption x2 mins each  Exercise 5: finger ladder flex, scaption x5  Exercise 6: elbow AAROM flexion/extension 5\"x10  Exercise 7: supine elbow extension stretch*  Exercise 8: prayer stretch with elbows on table 5\"x10  Exercise 10: digiflex red single digit flexion x10 and  x20; 1st digit flexion/pinching towards pal simulating aerosol spray can 5\"x10  Exercise 11: rubberband on large  bottle x10  Exercise 12: motor planning wire x7  Exercise 13: L elbow flex/ext, wrist flex/extension with gentle carpal distraction PROM  Exercise 20: HEP: wall slides vs. table slides, prayer stretch on table  Treatment Reasoning  Limitations addressed: Mobility    Manual:   Manual Therapy  Soft Tissue Mobilizaton: L bicep and extensor wad with gentle PROM to the L elbow, forerm, and wrist (see ther ex for specifics) x12 mins total     *Indicates exercise, modality, or manual techniques to be initiated when appropriate    Objective Measures:     Strength: [] NT  [x] MMT completed: Other:  (3rd run avergae of 3) L 9.3#    ROM: [] NT  [x] ROM measurements:  AROM LUE (degrees)  L Shoulder Flexion 0-180: 140  L Shoulder ABduction 0-180: 80  L Shoulder Int Rotation  0-70: limited d/t elbow mobility  L Shoulder Ext Rotation  0-90: limited d/t elbow mobility  L Elbow Flexion 0-145: 112  L Elbow Extension 145-0: -25 from neutral     Balance: [x] NT    Observations: [x] NT    Assessment: Body Structures, Functions, Activity Limitations Requiring Skilled Therapeutic Intervention: Decreased functional mobility ,Decreased ADL status,Decreased ROM,Decreased strength,Increased pain,Decreased posture,Decreased high-level IADLs,Decreased fine motor control,Decreased coordination  Assessment: Pt reports feeling significant improvement in ease of daily activiites though conts to have difficulty d/t  strenth and ROM limitations. Pt demos slight improvement in L  strength and shoulder AROM this date vs evaluation though contd limitations noted. No significant change in elbow AROM this date vs last measure.   Treatment Diagnosis: decreased L shoulder, elbow, and wrist AROM, decreased L UE and hand strength, impaired fine motor control, impaired functional activity tolerance, L UE pain and edema  Therapy Prognosis: Good,Fair       Patient Education: [] NA  Patient Education: Contd education on keeping L elbow in extended/relaxed position vs protected elbow flexion throughout the day    Post-Pain Assessment:       Pain Rating (0-10 pain scale):  0 /10   Location and pain description same as pre-treatment unless indicated. Action: [] NA   [] Perform HEP  [] Meds as prescribed  [] Modalities as prescribed   [] Call Physician     GOALS   Patient Goal(s): Patient goals : \"Full motion of elbow, weakness remedy\"    Short Term Goals Completed by 3 weeks Goal Status   STG 1 The pt will have decreased L elbow pain </= 1/10 in order to increase ease with ADL's In progress       Long Term Goals Completed by 8 weeks Goal Status   LTG 1 The pt will be indep/compliant with HEP in order to self-manage symptoms upon D/C In progress   LTG 2 The pt will have a increase in UEFI score >/=60/80 points in order to increase functional activity tolerance In progress   LTG 3 The pt will demo improved L shoulder and wrist AROM WNL's L elbow AROM >/=-* in order to increase ease with ADL's In progress,Partially met   LTG 4 The pt will demonstrate improved L UE strength >/=4/5 in order to lift/carry with decreased pain (when able to perform strengthening per MD) In progress   LTG 5 The pt will demo improved L  and pinch strength WNL's of the R in order to increase ease with opening jars and driving when appropriate In progress     Plan:  Frequency/Duration:  Plan  Plan Frequency: 1-2  Plan weeks: 8  Specific Instructions for Next Treatment: Possible decrease to 1x after next week depedent on elbow ext ROM  Current Treatment Recommendations: Strengthening,ROM,Neuromuscular re-education,Manual Therapy - Soft Tissue Mobilization,Modalities,Patient/Caregiver education & training,Home exercise program,Therapeutic activities  Plan Comment: Start 2x per week and decrease to 1x as able with improved ROM  Pt to continue current HEP. See objective section for any therapeutic exercise changes, additions or modifications this date.     Therapy Time: PT Individual Minutes  Time In: 9927  Time Out: 4354  Minutes: 50  Timed Code Treatment Minutes: 50 Minutes  Procedure Minutes: 0  Timed Activity Minutes Units   Ther Ex 38 2   Manual  12 1     Electronically signed by Michael Turcios PT on 6/16/22 at 9:51 AM EDT

## 2022-06-20 ENCOUNTER — HOSPITAL ENCOUNTER (OUTPATIENT)
Dept: PHYSICAL THERAPY | Age: 57
Setting detail: THERAPIES SERIES
Discharge: HOME | End: 2022-06-20
Payer: COMMERCIAL

## 2022-06-20 PROCEDURE — 97110 THERAPEUTIC EXERCISES: CPT

## 2022-06-20 NOTE — PROGRESS NOTES
[address] [facility]  Outpatient Physical Therapy    Treatment Note        Date: 2022  Patient: Acacia White  : 1965   Confirmed: Yes  MRN: [id number]  Referring Provider: Mariaelena London MD   Secondary Referring Provider (If applicable):     Medical Diagnosis: Unspecified fracture of lower end of left humerus, initial encounter for closed fracture [S42.402A]    Treatment Diagnosis: decreased L shoulder, elbow, and wrist AROM, decreased L UE and hand strength, impaired fine motor control, impaired functional activity tolerance, L UE pain and edema    Visit Information:  Insurance: Payor: [employer] / Plan: [employer] / Product Type: *No Product type* /   PT Visit Information  Onset Date: 22  Total # of Visits Approved: 21 (Eval +20V)  Total # of Visits to Date: 5  No Show: 0  Canceled Appointment: 0  Progress Note Counter:     Subjective Information:  Subjective: Pt reports she was able to put on deoderant for the first time this AM, is now able to use steering wheel, and has been trying to let her arm hang at her side more vs keeping it bent position.   HEP Compliance:  [x] Good [] Fair [] Poor [] Reports not doing due to:    Pain Screening  Patient Currently in Pain: Denies    Treatment:  Exercises:  Exercises  Exercise 1: yellow clips placing/reaching on pole (x10) x2  Exercise 2: forearm pronation/supination holding red therabar 5\"x20  Exercise 3: wall slides flex/scaption 5\"x10- Focus on elbow, wrist, and shoulder ROM  Exercise 4: pulleys flexion, scaption x2 mins each  Exercise 6: elbow AROM flexion/extension 5\"x10  Exercise 7: supine elbow extension stretch*  Exercise 8: prayer stretch with elbows on table 30\"x3  Exercise 10: digiflex red single digit flexion x10 and  x20  Exercise 11: rubberband on large  bottle x10  Exercise 13: L elbow flex/ext, wrist flex/extension, forearm supination PROM x10 mins  Exercise 20: HEP: cont current  Treatment Reasoning  Limitations addressed: Mobility    Manual:   Manual Therapy  Soft Tissue Mobilizaton: L distal bicep with gentle PROM to the L elbow  *Indicates exercise, modality, or manual techniques to be initiated when appropriate    Objective Measures:   Strength: [x] NT  [] MMT completed:    ROM: [] NT  [x] ROM measurements:  AROM LUE (degrees)  L Elbow Flexion 0-145: 109  L Elbow Extension 145-0: -18 from neutral  L Wrist Flexion 0-80: 72  L Wrist Extension 0-70: 53  L Wrist Radial Deviation 0-20: 20  L Wrist Ulnar Deviation 0-45: 40     Assessment: Body Structures, Functions, Activity Limitations Requiring Skilled Therapeutic Intervention: Decreased functional mobility ,Decreased ADL status,Decreased ROM,Decreased strength,Increased pain,Decreased posture,Decreased high-level IADLs,Decreased fine motor control,Decreased coordination  Assessment: Pt conts to demo increasing L elbow and wrist AROM especially in extension though with contd limitations at end ranges. Pt fatigued with clip exercise this date requiring RB x1 before completing 2 rounds. Otherwise good tolerance to tx without reports of pain throughout. Treatment Diagnosis: decreased L shoulder, elbow, and wrist AROM, decreased L UE and hand strength, impaired fine motor control, impaired functional activity tolerance, L UE pain and edema  Therapy Prognosis: Good,Fair     Post-Pain Assessment:       Pain Rating (0-10 pain scale):   0/10   Location and pain description same as pre-treatment unless indicated.    Action: [] NA   [x] Perform HEP  [] Meds as prescribed  [] Modalities as prescribed   [] Call Physician     GOALS   Patient Goal(s): Patient goals : \"Full motion of elbow, weakness remedy\"    Short Term Goals Completed by 3 weeks Goal Status   STG 1 The pt will have decreased L elbow pain </= 1/10 in order to increase ease with ADL's In progress       Long Term Goals Completed by 8 weeks Goal Status   LTG 1 The pt will be indep/compliant with HEP in order to self-manage symptoms upon D/C In progress   LTG 2 The pt will have a increase in UEFI score >/=60/80 points in order to increase functional activity tolerance In progress   LTG 3 The pt will demo improved L shoulder and wrist AROM WNL's L elbow AROM >/=-* in order to increase ease with ADL's In progress,Partially met   LTG 4 The pt will demonstrate improved L UE strength >/=4/5 in order to lift/carry with decreased pain (when able to perform strengthening per MD) In progress   LTG 5 The pt will demo improved L  and pinch strength WNL's of the R in order to increase ease with opening jars and driving when appropriate In progress     Plan:  Frequency/Duration:  Plan  Plan Frequency: 1-2  Plan weeks: 8  Specific Instructions for Next Treatment: Possible decrease to 1x after next week depedent on elbow ext ROM  Current Treatment Recommendations: Strengthening,ROM,Neuromuscular re-education,Manual Therapy - Soft Tissue Mobilization,Modalities,Patient/Caregiver education & training,Home exercise program,Therapeutic activities  Plan Comment: Start 2x per week and decrease to 1x as able with improved ROM  Pt to continue current HEP. See objective section for any therapeutic exercise changes, additions or modifications this date.     Therapy Time:      PT Individual Minutes  Time In: 7710  Time Out: 8024  Minutes: 42  Timed Code Treatment Minutes: 42 Minutes  Procedure Minutes:0  Timed Activity Minutes Units   Ther Ex 42 3     Electronically signed by Jeannine Garcia PT on 6/20/22 at 9:47 AM EDT

## 2022-06-23 ENCOUNTER — HOSPITAL ENCOUNTER (OUTPATIENT)
Dept: PHYSICAL THERAPY | Age: 57
Setting detail: THERAPIES SERIES
Discharge: HOME | End: 2022-06-23
Payer: COMMERCIAL

## 2022-06-23 PROCEDURE — 97110 THERAPEUTIC EXERCISES: CPT

## 2022-06-23 ASSESSMENT — PAIN DESCRIPTION - ORIENTATION: ORIENTATION: LEFT

## 2022-06-23 ASSESSMENT — PAIN DESCRIPTION - DESCRIPTORS: DESCRIPTORS: DISCOMFORT

## 2022-06-23 ASSESSMENT — PAIN DESCRIPTION - LOCATION: LOCATION: ELBOW

## 2022-06-23 ASSESSMENT — PAIN SCALES - GENERAL: PAINLEVEL_OUTOF10: 2

## 2022-06-23 NOTE — PROGRESS NOTES
[address] [facility]  Outpatient Physical Therapy    Treatment Note        Date: 2022  Patient: Rahel Tracy  : 1965   Confirmed: Yes  MRN: [id number]  Referring Provider: Gerard Cano MD   Secondary Referring Provider (If applicable):     Medical Diagnosis: Unspecified fracture of lower end of left humerus, initial encounter for closed fracture [S42.402A]    Treatment Diagnosis: decreased L shoulder, elbow, and wrist AROM, decreased L UE and hand strength, impaired fine motor control, impaired functional activity tolerance, L UE pain and edema    Visit Information:  Insurance: Payor: [employer] / Plan: [employer] / Product Type: *No Product type* /   PT Visit Information  Onset Date: 22  Total # of Visits Approved: 21 (Eval +20V)  Total # of Visits to Date: 6  No Show: 0  Canceled Appointment: 0  Progress Note Counter:     Subjective Information:  Subjective: Pt reports shes doing good has discomfort (muscular pain the the arm superior to the elbow). HEP Compliance:  [x] Good [] Fair [] Poor [] Reports not doing due to:    Pain Screening  Patient Currently in Pain: Yes  Pain Level: 2  Pain Location: Elbow  Pain Orientation: Left  Pain Descriptors: Discomfort    Treatment:  Exercises:  Exercises  Exercise 1: yellow clips placing/reaching on pole (x10) x2  Exercise 2: forearm pronation/supination holding red therabar 5\"x20  Exercise 4: pulleys flexion, 3 mins  Exercise 5: Hand x   x 10 x 2  Exercise 6: elbow AROM flexion/extension 5\"x10  Exercise 7: supine elbow extension stretch*  Exercise 8: prayer stretch with elbows on table 30\"x3  Exercise 9: power web x 10 Push/Pull/ Pronation/ supination. hold push/pull NV  Exercise 11: Red digiflex 3# x 15  whole hand, 10 each individual finger w/20 on ring and pinky finger together for irradiation of muscle firing. Exercise 12: Baps board attachment screwing in and unscrewing from rack.  x 5 Level 1 and 2 alternating Pt required increased rest periods throughout set. Exercise 13: L elbow flex/ext, wrist flex/extension, forearm supination PROM x5 mins  Exercise 20: HEP: cont current  Treatment Reasoning  Limitations addressed: Mobility    *Indicates exercise, modality, or manual techniques to be initiated when appropriate    Objective Measures:     Strength: [x] NT  [] MMT completed:    ROM: [] NT  [x] ROM measurements:     AROM LUE (degrees)  L Shoulder Flexion 0-180: 138  L Shoulder ABduction 0-180: 126  L Elbow Flexion 0-145: 128  L Elbow Extension 145-0: -14 from neutral     Balance: [x] NT       Observations: [x] NT       Assessment: Body Structures, Functions, Activity Limitations Requiring Skilled Therapeutic Intervention: Decreased functional mobility ,Decreased ADL status,Decreased ROM,Decreased strength,Increased pain,Decreased posture,Decreased high-level IADLs,Decreased fine motor control,Decreased coordination  Assessment: Pt demonstrated functional improvements in activities associated with IADLs, Pt continues to be Involved and inquisitive about best practices she can do both in therapy and at home. Pt demonstrated improvements in AROM. Pt still demonstrates deficits in the ring and little finger of the left arm. holding push pull with power web NV d/t it not being challenging to the pt. Treatment Diagnosis: decreased L shoulder, elbow, and wrist AROM, decreased L UE and hand strength, impaired fine motor control, impaired functional activity tolerance, L UE pain and edema  Therapy Prognosis: Good,Fair       Patient Education: [x] educated pt on benefit of refining things she feels she is good at to improve fine motor movement and functionality in ADLs, also educated patient on importance of pacing and rest during exercises and activities. Post-Pain Assessment:       Pain Rating (0-10 pain scale):  2 /10   Location and pain description same as pre-treatment unless indicated. Action: [] NA   [x] Perform HEP  [] Meds as prescribed  [] Modalities as prescribed   [] Call Physician     GOALS   Patient Goal(s): Patient goals : \"Full motion of elbow, weakness remedy\"    Short Term Goals Completed by 3 weeks Goal Status   STG 1 The pt will have decreased L elbow pain </= 1/10 in order to increase ease with ADL's In progress       Long Term Goals Completed by 8 weeks Goal Status   LTG 1 The pt will be indep/compliant with HEP in order to self-manage symptoms upon D/C In progress   LTG 2 The pt will have a increase in UEFI score >/=60/80 points in order to increase functional activity tolerance In progress   LTG 3 The pt will demo improved L shoulder and wrist AROM WNL's L elbow AROM >/=-* in order to increase ease with ADL's In progress,Partially met   LTG 4 The pt will demonstrate improved L UE strength >/=4/5 in order to lift/carry with decreased pain (when able to perform strengthening per MD) In progress   LTG 5 The pt will demo improved L  and pinch strength WNL's of the R in order to increase ease with opening jars and driving when appropriate In progress            Plan:  Frequency/Duration:  Plan  Plan Frequency: 1-2  Plan weeks: 8  Current Treatment Recommendations: Strengthening,ROM,Neuromuscular re-education,Manual Therapy - Soft Tissue Mobilization,Modalities,Patient/Caregiver education & training,Home exercise program,Therapeutic activities  Plan Comment: decrease to 1x a week as pt demos improved ROM  Pt to continue current HEP. See objective section for any therapeutic exercise changes, additions or modifications this date.     Therapy Time:      PT Individual Minutes  Time In: 8205  Time Out: 4892  Minutes: 46  Timed Code Treatment Minutes: 46 Minutes  Procedure Minutes: 0  Timed Activity Minutes Units   Ther Ex 46 3     Electronically signed by Umberto Ragsdale PTA on 6/23/22 at 10:04 AM EDT

## 2022-06-27 ENCOUNTER — HOSPITAL ENCOUNTER (OUTPATIENT)
Dept: PHYSICAL THERAPY | Age: 57
Setting detail: THERAPIES SERIES
Discharge: HOME | End: 2022-06-27
Payer: COMMERCIAL

## 2022-06-27 PROCEDURE — 97110 THERAPEUTIC EXERCISES: CPT

## 2022-06-27 ASSESSMENT — PAIN DESCRIPTION - LOCATION: LOCATION: ARM

## 2022-06-27 ASSESSMENT — PAIN DESCRIPTION - DESCRIPTORS: DESCRIPTORS: DISCOMFORT

## 2022-06-27 ASSESSMENT — PAIN DESCRIPTION - ORIENTATION: ORIENTATION: LEFT

## 2022-06-27 ASSESSMENT — PAIN SCALES - GENERAL: PAINLEVEL_OUTOF10: 3

## 2022-06-27 NOTE — PROGRESS NOTES
[address] [facility]  Outpatient Physical Therapy    Treatment Note        Date: 2022  Patient: Janelle Lundberg  : 1965   Confirmed: Yes  MRN: [id number]  Referring Provider: Vidal Miller MD   Secondary Referring Provider (If applicable):     Medical Diagnosis: Unspecified fracture of lower end of left humerus, initial encounter for closed fracture [S42.402A]    Treatment Diagnosis: decreased L shoulder, elbow, and wrist AROM, decreased L UE and hand strength, impaired fine motor control, impaired functional activity tolerance, L UE pain and edema    Visit Information:  Insurance: Payor: [employer] / Plan: [employer] / Product Type: *No Product type* /   PT Visit Information  Onset Date: 22  Total # of Visits Approved: 21 (Eval +20V)  Total # of Visits to Date: 7  No Show: 0  Canceled Appointment: 0  Progress Note Counter: -    Subjective Information:  Subjective: Pt conts to report some discomfort/soreness \"It's because I'm using it more. \" Has not been doing wall slides for HEP. RTD 22. HEP Compliance:  [x] Good [] Fair [] Poor [] Reports not doing due to:    Pain Screening  Patient Currently in Pain: Yes  Pain Assessment: 0-10  Pain Level: 3  Pain Location: Arm (forearm)  Pain Orientation: Left  Pain Descriptors: Discomfort    Treatment:  Exercises:  Exercises  Exercise 1: Red clips placing/reaching on pole (x10) x2  Exercise 2: forearm pronation/supination holding red therabar 5\"x20  Exercise 4: pulleys flexion and scaption x 3 mins ea. Exercise 5: Hand x   x 10 x 2  Exercise 8: wrist flexion and extension stretches with elbow straight in avaialbel ROM 30\"x3  Exercise 9: power web finger spred (yellow/green) 5\"x20  Exercise 11: Red digiflex x 15  whole hand, 10 each individual finger w/ 20 on ring and pinky finger together for irradiation of muscle firing.   Exercise 12: Valpar work station with reaching overhead removing triangle and placing overhead x1  Exercise 20: HEP: wrist flexion/extension stretches  Treatment Reasoning  Limitations addressed: Mobility    *Indicates exercise, modality, or manual techniques to be initiated when appropriate    Objective Measures:      Strength: [x] NT  [] MMT completed:    ROM: [x] NT  [] ROM measurements:     Balance: [x] NT       Observations: [x] NT       Assessment: Body Structures, Functions, Activity Limitations Requiring Skilled Therapeutic Intervention: Decreased functional mobility ,Decreased ADL status,Decreased ROM,Decreased strength,Increased pain,Decreased posture,Decreased high-level IADLs,Decreased fine motor control,Decreased coordination  Assessment: Able to progress reps of select exercises and perfomr red vs yellow clips for improved /pinch strength with good tolerance. Initiated Valpar work station for improved fucntional mobility and overhead activity tolerance with good tolerance though fatigue noted. Treatment Diagnosis: decreased L shoulder, elbow, and wrist AROM, decreased L UE and hand strength, impaired fine motor control, impaired functional activity tolerance, L UE pain and edema  Therapy Prognosis: Good,Fair       Patient Education: [] NA  Patient Education: Increase compliance with wall slides for improved shoulder mobility; Reviewed self STM to extensor wad complex with good understanding    Post-Pain Assessment:       Pain Rating (0-10 pain scale):  0 /10   Location and pain description same as pre-treatment unless indicated.    Action: [] NA   [x] Perform HEP  [] Meds as prescribed  [] Modalities as prescribed   [] Call Physician     GOALS   Patient Goal(s): Patient goals : \"Full motion of elbow, weakness remedy\"    Short Term Goals Completed by 3 weeks Goal Status   STG 1 The pt will have decreased L elbow pain </= 1/10 in order to increase ease with ADL's In progress     Long Term Goals Completed by 8 weeks Goal Status   LTG 1 The pt will be indep/compliant with HEP in order to self-manage symptoms upon D/C In progress   LTG 2 The pt will have a increase in UEFI score >/=60/80 points in order to increase functional activity tolerance In progress   LTG 3 The pt will demo improved L shoulder and wrist AROM WNL's L elbow AROM >/=-* in order to increase ease with ADL's In progress,Partially met   LTG 4 The pt will demonstrate improved L UE strength >/=4/5 in order to lift/carry with decreased pain (when able to perform strengthening per MD) In progress   LTG 5 The pt will demo improved L  and pinch strength WNL's of the R in order to increase ease with opening jars and driving when appropriate In progress     Plan:  Frequency/Duration:  Plan  Plan Frequency: 1-2  Plan weeks: 8  Current Treatment Recommendations: Strengthening,ROM,Neuromuscular re-education,Manual Therapy - Soft Tissue Mobilization,Modalities,Patient/Caregiver education & training,Home exercise program,Therapeutic activities  Plan Comment: cont 1x per week  Pt to continue current HEP. See objective section for any therapeutic exercise changes, additions or modifications this date.     Therapy Time:      PT Individual Minutes  Time In: 0900  Time Out: 1902  Minutes: 45  Timed Code Treatment Minutes: 45 Minutes  Procedure Minutes:0  Timed Activity Minutes Units   Ther Ex 45 3     Electronically signed by Jeannine Garcia PT on 6/27/22 at 9:47 AM EDT

## 2022-06-30 ENCOUNTER — APPOINTMENT (OUTPATIENT)
Dept: PHYSICAL THERAPY | Age: 57
End: 2022-06-30
Payer: COMMERCIAL

## 2022-07-05 ENCOUNTER — HOSPITAL ENCOUNTER (OUTPATIENT)
Dept: LAB | Age: 57
Discharge: HOME | End: 2022-07-05
Payer: COMMERCIAL

## 2022-07-05 ENCOUNTER — OFFICE VISIT (OUTPATIENT)
Dept: FAMILY MEDICINE CLINIC | Age: 57
End: 2022-07-05
Payer: COMMERCIAL

## 2022-07-05 VITALS
TEMPERATURE: 97.2 F | SYSTOLIC BLOOD PRESSURE: 124 MMHG | DIASTOLIC BLOOD PRESSURE: 86 MMHG | HEART RATE: 71 BPM | OXYGEN SATURATION: 99 % | BODY MASS INDEX: 34.64 KG/M2 | WEIGHT: 214.6 LBS

## 2022-07-05 DIAGNOSIS — Z11.4 ENCOUNTER FOR SCREENING FOR HIV: ICD-10-CM

## 2022-07-05 DIAGNOSIS — Z13.220 LIPID SCREENING: ICD-10-CM

## 2022-07-05 DIAGNOSIS — Z12.11 COLON CANCER SCREENING: ICD-10-CM

## 2022-07-05 DIAGNOSIS — D72.819 LEUKOPENIA, UNSPECIFIED TYPE: ICD-10-CM

## 2022-07-05 DIAGNOSIS — Z86.39 HISTORY OF HYPOTHYROIDISM: Primary | ICD-10-CM

## 2022-07-05 DIAGNOSIS — Z13.1 DIABETES MELLITUS SCREENING: ICD-10-CM

## 2022-07-05 DIAGNOSIS — Z86.39 HISTORY OF HYPOTHYROIDISM: ICD-10-CM

## 2022-07-05 DIAGNOSIS — Z12.31 SCREENING MAMMOGRAM FOR BREAST CANCER: ICD-10-CM

## 2022-07-05 DIAGNOSIS — Z53.20 CERVICAL CANCER SCREENING DECLINED: ICD-10-CM

## 2022-07-05 DIAGNOSIS — Z11.59 ENCOUNTER FOR HEPATITIS C SCREENING TEST FOR LOW RISK PATIENT: ICD-10-CM

## 2022-07-05 DIAGNOSIS — F41.9 ANXIETY: ICD-10-CM

## 2022-07-05 LAB
BASOPHILS ABSOLUTE: 0.1 K/UL (ref 0–0.2)
BASOPHILS RELATIVE PERCENT: 1.7 %
CHOLESTEROL, FASTING: 202 MG/DL (ref 0–199)
EOSINOPHILS ABSOLUTE: 0.2 K/UL (ref 0–0.7)
EOSINOPHILS RELATIVE PERCENT: 5.1 %
HBA1C MFR BLD: 5.7 % (ref 4.8–5.9)
HCT VFR BLD CALC: 42.1 % (ref 37–47)
HDLC SERPL-MCNC: 60 MG/DL (ref 40–59)
HEMOGLOBIN: 13.7 G/DL (ref 12–16)
LDL CHOLESTEROL CALCULATED: 119 MG/DL (ref 0–129)
LYMPHOCYTES ABSOLUTE: 1.3 K/UL (ref 1–4.8)
LYMPHOCYTES RELATIVE PERCENT: 31.4 %
MCH RBC QN AUTO: 29 PG (ref 27–31.3)
MCHC RBC AUTO-ENTMCNC: 32.6 % (ref 33–37)
MCV RBC AUTO: 89 FL (ref 82–100)
MONOCYTES ABSOLUTE: 0.4 K/UL (ref 0.2–0.8)
MONOCYTES RELATIVE PERCENT: 8.6 %
NEUTROPHILS ABSOLUTE: 2.3 K/UL (ref 1.4–6.5)
NEUTROPHILS RELATIVE PERCENT: 53.2 %
PDW BLD-RTO: 13.8 % (ref 11.5–14.5)
PLATELET # BLD: 229 K/UL (ref 130–400)
RBC # BLD: 4.73 M/UL (ref 4.2–5.4)
T4 FREE: 0.73 NG/DL (ref 0.84–1.68)
TRIGLYCERIDE, FASTING: 116 MG/DL (ref 0–150)
TSH SERPL DL<=0.05 MIU/L-ACNC: 35.03 UIU/ML (ref 0.44–3.86)
WBC # BLD: 4.2 K/UL (ref 4.8–10.8)

## 2022-07-05 PROCEDURE — 84443 ASSAY THYROID STIM HORMONE: CPT

## 2022-07-05 PROCEDURE — G8417 CALC BMI ABV UP PARAM F/U: HCPCS | Performed by: FAMILY MEDICINE

## 2022-07-05 PROCEDURE — 1036F TOBACCO NON-USER: CPT | Performed by: FAMILY MEDICINE

## 2022-07-05 PROCEDURE — 83036 HEMOGLOBIN GLYCOSYLATED A1C: CPT

## 2022-07-05 PROCEDURE — 86803 HEPATITIS C AB TEST: CPT

## 2022-07-05 PROCEDURE — 3017F COLORECTAL CA SCREEN DOC REV: CPT | Performed by: FAMILY MEDICINE

## 2022-07-05 PROCEDURE — 36415 COLL VENOUS BLD VENIPUNCTURE: CPT

## 2022-07-05 PROCEDURE — 80061 LIPID PANEL: CPT

## 2022-07-05 PROCEDURE — 85025 COMPLETE CBC W/AUTO DIFF WBC: CPT

## 2022-07-05 PROCEDURE — 99204 OFFICE O/P NEW MOD 45 MIN: CPT | Performed by: FAMILY MEDICINE

## 2022-07-05 PROCEDURE — 84439 ASSAY OF FREE THYROXINE: CPT

## 2022-07-05 PROCEDURE — 87389 HIV-1 AG W/HIV-1&-2 AB AG IA: CPT

## 2022-07-05 PROCEDURE — 86376 MICROSOMAL ANTIBODY EACH: CPT

## 2022-07-05 PROCEDURE — G8427 DOCREV CUR MEDS BY ELIG CLIN: HCPCS | Performed by: FAMILY MEDICINE

## 2022-07-05 RX ORDER — HYDROXYZINE HYDROCHLORIDE 10 MG/1
10 TABLET, FILM COATED ORAL 3 TIMES DAILY PRN
Qty: 30 TABLET | Refills: 0 | Status: SHIPPED | OUTPATIENT
Start: 2022-07-05 | End: 2022-08-22

## 2022-07-05 RX ORDER — ALPRAZOLAM 0.5 MG/1
TABLET, EXTENDED RELEASE ORAL
Status: CANCELLED | OUTPATIENT
Start: 2022-07-05

## 2022-07-05 SDOH — ECONOMIC STABILITY: FOOD INSECURITY: WITHIN THE PAST 12 MONTHS, THE FOOD YOU BOUGHT JUST DIDN'T LAST AND YOU DIDN'T HAVE MONEY TO GET MORE.: NEVER TRUE

## 2022-07-05 SDOH — ECONOMIC STABILITY: FOOD INSECURITY: WITHIN THE PAST 12 MONTHS, YOU WORRIED THAT YOUR FOOD WOULD RUN OUT BEFORE YOU GOT MONEY TO BUY MORE.: NEVER TRUE

## 2022-07-05 ASSESSMENT — ENCOUNTER SYMPTOMS
CONSTIPATION: 0
APNEA: 0
FACIAL SWELLING: 0
EYE REDNESS: 0
NAUSEA: 0
DIARRHEA: 0
VOMITING: 0
ABDOMINAL PAIN: 0
CHOKING: 0
RHINORRHEA: 0
BACK PAIN: 0
ABDOMINAL DISTENTION: 0
EYE PAIN: 0
SHORTNESS OF BREATH: 0
PHOTOPHOBIA: 0
SINUS PRESSURE: 0
EYE ITCHING: 0
ANAL BLEEDING: 0
BLOOD IN STOOL: 0
COUGH: 0
WHEEZING: 0
TROUBLE SWALLOWING: 0
CHEST TIGHTNESS: 0
EYE DISCHARGE: 0
COLOR CHANGE: 0

## 2022-07-05 ASSESSMENT — COLUMBIA-SUICIDE SEVERITY RATING SCALE - C-SSRS
2. HAVE YOU ACTUALLY HAD ANY THOUGHTS OF KILLING YOURSELF?: NO
6. HAVE YOU EVER DONE ANYTHING, STARTED TO DO ANYTHING, OR PREPARED TO DO ANYTHING TO END YOUR LIFE?: NO
1. WITHIN THE PAST MONTH, HAVE YOU WISHED YOU WERE DEAD OR WISHED YOU COULD GO TO SLEEP AND NOT WAKE UP?: NO

## 2022-07-05 ASSESSMENT — PATIENT HEALTH QUESTIONNAIRE - PHQ9
SUM OF ALL RESPONSES TO PHQ QUESTIONS 1-9: 0
2. FEELING DOWN, DEPRESSED OR HOPELESS: 0
SUM OF ALL RESPONSES TO PHQ QUESTIONS 1-9: 0
7. TROUBLE CONCENTRATING ON THINGS, SUCH AS READING THE NEWSPAPER OR WATCHING TELEVISION: 0
SUM OF ALL RESPONSES TO PHQ9 QUESTIONS 1 & 2: 0
3. TROUBLE FALLING OR STAYING ASLEEP: 0
5. POOR APPETITE OR OVEREATING: 0
8. MOVING OR SPEAKING SO SLOWLY THAT OTHER PEOPLE COULD HAVE NOTICED. OR THE OPPOSITE, BEING SO FIGETY OR RESTLESS THAT YOU HAVE BEEN MOVING AROUND A LOT MORE THAN USUAL: 0
9. THOUGHTS THAT YOU WOULD BE BETTER OFF DEAD, OR OF HURTING YOURSELF: 0
6. FEELING BAD ABOUT YOURSELF - OR THAT YOU ARE A FAILURE OR HAVE LET YOURSELF OR YOUR FAMILY DOWN: 0
10. IF YOU CHECKED OFF ANY PROBLEMS, HOW DIFFICULT HAVE THESE PROBLEMS MADE IT FOR YOU TO DO YOUR WORK, TAKE CARE OF THINGS AT HOME, OR GET ALONG WITH OTHER PEOPLE: 0
SUM OF ALL RESPONSES TO PHQ QUESTIONS 1-9: 0
4. FEELING TIRED OR HAVING LITTLE ENERGY: 0
1. LITTLE INTEREST OR PLEASURE IN DOING THINGS: 0
SUM OF ALL RESPONSES TO PHQ QUESTIONS 1-9: 0

## 2022-07-05 ASSESSMENT — SOCIAL DETERMINANTS OF HEALTH (SDOH): HOW HARD IS IT FOR YOU TO PAY FOR THE VERY BASICS LIKE FOOD, HOUSING, MEDICAL CARE, AND HEATING?: NOT HARD AT ALL

## 2022-07-05 NOTE — PROGRESS NOTES
Subjective:      Patient ID: Josr Arriola is a 61 y.o. female who presents today for:     Chief Complaint   Patient presents with    New Patient     refills, pt would like labs pt is fasting, states she needs thyroid medication unsure of type        HPI  Patient is a very pleasant 48-year-old female presents today to establish care. She has not been seen by her regular primary care physician in the past 6 years. She states that she feels well denies any complaints other than a recent closed fracture dislocation of her left elbow joint, which required surgery. She plans to have the plate removed in September. She is open to some preventative screenings but not all. She denies any chest discomfort, shortness of breath or lower extremity edema. She is usually very active. She states that she has a history of hypothyroidism or hyperthyroidism but has not been on medication in over 6 years. She denies any fatigue, or palpitations. Patient also has a history of anxiety that has only been exacerbated by her recent surgery. She states that she was given Xanax to help control her symptoms. She states that she takes it on an as-needed basis approximately 3 times per week. Prior to surgery, she was not on any medication  History reviewed. No pertinent past medical history. History reviewed. No pertinent surgical history. Family History   Problem Relation Age of Onset    Other Mother     Other Father      Social History     Socioeconomic History    Marital status:      Spouse name: Not on file    Number of children: Not on file    Years of education: Not on file    Highest education level: Not on file   Occupational History    Not on file   Tobacco Use    Smoking status: Never Smoker    Smokeless tobacco: Never Used   Vaping Use    Vaping Use: Never used   Substance and Sexual Activity    Alcohol use:  Yes     Alcohol/week: 0.0 standard drinks     Comment: 1 glass per week    Drug use: No    Sexual activity: Not on file   Other Topics Concern    Not on file   Social History Narrative    Not on file     Social Determinants of Health     Financial Resource Strain: Low Risk     Difficulty of Paying Living Expenses: Not hard at all   Food Insecurity: No Food Insecurity    Worried About Running Out of Food in the Last Year: Never true    [address] in the Last Year: Never true   Transportation Needs:     Lack of Transportation (Medical): Not on file    Lack of Transportation (Non-Medical): Not on file   Physical Activity:     Days of Exercise per Week: Not on file    Minutes of Exercise per Session: Not on file   Stress:     Feeling of Stress : Not on file   Social Connections:     Frequency of Communication with Friends and Family: Not on file    Frequency of Social Gatherings with Friends and Family: Not on file    Attends Congregational Services: Not on file    Active Member of [address] [place] [address] [employer] or Organizations: Not on file    Attends Club or Organization Meetings: Not on file    Marital Status: Not on file   Intimate Partner Violence:     Fear of Current or Ex-Partner: Not on file    Emotionally Abused: Not on file    Physically Abused: Not on file    Sexually Abused: Not on file   Housing Stability:     Unable to Pay for Housing in the Last Year: Not on file    Number of [place] in the Last Year: Not on file    Unstable Housing in the Last Year: Not on file     Current Outpatient Medications on File Prior to Visit   Medication Sig Dispense Refill    ALPRAZolam (XANAX XR) 0.5 MG extended release tablet TAKE 1 TABLET BY MOUTH EVERY 8 HOURS AS NEEDED FOR ANXIETY. DO NOT CRUSH, CHEW, SPLIT      acetaminophen (TYLENOL) 500 MG tablet Take 500 mg by mouth every 6 hours as needed for Pain       No current facility-administered medications on file prior to visit.        Allergies:  Penicillins    Review of Systems   Constitutional: Negative for activity change, appetite change, chills, diaphoresis, fatigue, fever and unexpected weight change. HENT: Negative for congestion, dental problem, ear discharge, ear pain, facial swelling, nosebleeds, rhinorrhea, sinus pressure, sneezing, tinnitus and trouble swallowing. Eyes: Negative for photophobia, pain, discharge, redness, itching and visual disturbance. Respiratory: Negative for apnea, cough, choking, chest tightness, shortness of breath and wheezing. Cardiovascular: Negative for chest pain, palpitations and leg swelling. Gastrointestinal: Negative for abdominal distention, abdominal pain, anal bleeding, blood in stool, constipation, diarrhea, nausea and vomiting. Endocrine: Negative for cold intolerance, heat intolerance, polydipsia, polyphagia and polyuria. Genitourinary: Negative for difficulty urinating, dyspareunia, dysuria, enuresis, flank pain, frequency and hematuria. Musculoskeletal: Positive for arthralgias. Negative for back pain, gait problem, joint swelling, myalgias, neck pain and neck stiffness. Skin: Negative for color change, pallor, rash and wound. Allergic/Immunologic: Negative for environmental allergies, food allergies and immunocompromised state. Neurological: Negative for dizziness, tremors, seizures, syncope, facial asymmetry, speech difficulty, light-headedness, numbness and headaches. Psychiatric/Behavioral: Negative for agitation, behavioral problems, confusion, dysphoric mood, hallucinations, self-injury, sleep disturbance and suicidal ideas. Objective:   /86   Pulse 71   Temp 97.2 °F (36.2 °C) (Infrared)   Wt 214 lb 9.6 oz (97.3 kg)   SpO2 99%   BMI 34.64 kg/m²     Physical Exam  Vitals and nursing note reviewed. Constitutional:       General: She is not in acute distress. Appearance: Normal appearance. She is well-developed. She is not diaphoretic. HENT:      Head: Normocephalic and atraumatic.       Nose: Nose normal.      Mouth/Throat:      Mouth: Mucous membranes are moist.      Pharynx: Oropharynx is clear. Eyes:      Conjunctiva/sclera: Conjunctivae normal.      Pupils: Pupils are equal, round, and reactive to light. Cardiovascular:      Rate and Rhythm: Normal rate and regular rhythm. Heart sounds: Normal heart sounds. No murmur heard. No friction rub. No gallop. Pulmonary:      Effort: Pulmonary effort is normal. No respiratory distress. Breath sounds: Normal breath sounds. No wheezing or rales. Chest:      Chest wall: No tenderness. Abdominal:      General: Abdomen is flat. Bowel sounds are normal.      Palpations: Abdomen is soft. Tenderness: There is no abdominal tenderness. Musculoskeletal:      Cervical back: Normal range of motion. Skin:     General: Skin is warm and dry. Neurological:      Mental Status: She is alert and oriented to person, place, and time. Psychiatric:         Behavior: Behavior normal.         Thought Content: Thought content normal.         Judgment: Judgment normal.         Assessment & Plan:     1. History of hypothyroidism  We will check current thyroid function and restart medication if needed  - TSH; Future  - Thyroid Peroxidase Antibody; Future  - T4, Free; Future    2. Lipid screening  Check current lipid panel  - Lipid, Fasting; Future    3. Anxiety  Believe that Xanax would be inappropriate at this time due to risk of dependence. We will provide a trial of hydroxyzine to see if this medication can provide the same benefit without the risks of the benzodiazepine  - hydrOXYzine HCl (ATARAX) 10 MG tablet; Take 1 tablet by mouth 3 times daily as needed for Anxiety  Dispense: 30 tablet; Refill: 0    4. Screening mammogram for breast cancer  Shared decision making  - RACHID DIGITAL SCREEN W OR WO CAD BILATERAL; Future    5. Colon cancer screening  Advised colonoscopy as the gold standard but patient elected Cologuard despite discussion of limitations  - Fecal DNA Colorectal cancer screening (Cologuard)    6. Cervical cancer screening declined  Patient may reconsider in the future    7. Diabetes mellitus screening  Patient noted to be hyperglycemic based on labs in the hospital  - Hemoglobin A1C; Future    8. Encounter for hepatitis C screening test for low risk patient  Decision making  - Hepatitis C Antibody; Future    9. HIV screening   - HIV Screen; Future    10. Leukopenia, unspecified type  Mild based on last labs while in the hospital  - CBC with Auto Differential; Future      Return in about 1 month (around 8/5/2022) for chronic condtions.     Elizabeth Rodríguez MD

## 2022-07-06 LAB — HIV AG/AB: NONREACTIVE

## 2022-07-07 ENCOUNTER — HOSPITAL ENCOUNTER (OUTPATIENT)
Dept: PHYSICAL THERAPY | Age: 57
Setting detail: THERAPIES SERIES
Discharge: HOME | End: 2022-07-07
Payer: COMMERCIAL

## 2022-07-07 DIAGNOSIS — D72.819 LEUKOPENIA, UNSPECIFIED TYPE: ICD-10-CM

## 2022-07-07 DIAGNOSIS — E03.9 HYPOTHYROIDISM, UNSPECIFIED TYPE: Primary | ICD-10-CM

## 2022-07-07 LAB — HEPATITIS C ANTIBODY: NONREACTIVE

## 2022-07-07 PROCEDURE — 97110 THERAPEUTIC EXERCISES: CPT

## 2022-07-07 RX ORDER — LEVOTHYROXINE SODIUM 0.03 MG/1
25 TABLET ORAL DAILY
Qty: 30 TABLET | Refills: 1 | Status: SHIPPED | OUTPATIENT
Start: 2022-07-07 | End: 2022-08-30

## 2022-07-07 NOTE — PROGRESS NOTES
[facility]  Outpatient Physical Therapy    Treatment Note        Date: 2022  Patient: Rojelio Shoulder  : 1965   Confirmed: Yes  MRN: [id number]  Referring Provider: Neal Gaspar MD   Secondary Referring Provider (If applicable):     Medical Diagnosis: Unspecified fracture of lower end of left humerus, initial encounter for closed fracture [S42.402A]    Treatment Diagnosis: decreased L shoulder, elbow, and wrist AROM, decreased L UE and hand strength, impaired fine motor control, impaired functional activity tolerance, L UE pain and edema    Visit Information:  Insurance: Payor: Selene Ronnieraul / Plan: Selene Ronnietanklori / Product Type: *No Product type* /   PT Visit Information  Onset Date: 22  Total # of Visits Approved: 21 (Eval +20V)  Total # of Visits to Date: 8  No Show: 0  Canceled Appointment: 0  Progress Note Counter: -    Subjective Information:  Subjective: No pain currently, therapy has been helping, Pt reports good compliance with HEP with no concerns with current program. Pt expressed that she would like to look at being done with therapy at next visit in order to save visit for after having plates and screws removed in September. HEP Compliance:  [x] Good [] Fair [] Poor [] Reports not doing due to:    Pain Screening  Patient Currently in Pain: Denies    Treatment:  Exercises:  Exercises  Exercise 1: Red clips placing/reaching on pole (x10) x2  Exercise 2: forearm pronation/supination holding red therabar 5\"x20 ( reviewed using hammer at home for continued progress with HEP)  Exercise 4: pulleys flexion and scaption x 3 mins ea. Exercise 9: power web finger spred, full  twist, push/pull  (yellow/green) 5\"x20  Exercise 10: digiflex red single digit flexion x10 and  x20  Exercise 11: Red digiflex x 15  whole hand, 10 each individual finger w/ 20 on ring and pinky finger together for irradiation of muscle firing.   Exercise 12: Valpar work station with reaching overhead removing triangle and placing overhead x1         Objective Measures:        Strength: [x] NT  [] MMT completed:     ROM: [x] NT  [] ROM measurements:        Balance: [x] NT       Observations: [x] NT       Assessment: Body Structures, Functions, Activity Limitations Requiring Skilled Therapeutic Intervention: Decreased functional mobility ,Decreased ADL status,Decreased ROM,Decreased strength,Increased pain,Decreased posture,Decreased high-level IADLs,Decreased fine motor control,Decreased coordination  Assessment: Reviewed ways to progress HEP exercises at home. Pt reports mild soreness post exercises today, continues to denie pain post tx session. Pt requesting DC at next visit with plans to return post hardware removal as needed in order to save therapy visit . Fatigued with today's tx session with Valpar performed at end of tx. Treatment Diagnosis: decreased L shoulder, elbow, and wrist AROM, decreased L UE and hand strength, impaired fine motor control, impaired functional activity tolerance, L UE pain and edema  Therapy Prognosis: Good,Fair       Patient Education: [x] NA       Post-Pain Assessment:       Pain Rating (0-10 pain scale):  0 /10   Location and pain description same as pre-treatment unless indicated.    Action: [] NA   [x] Perform HEP  [] Meds as prescribed  [] Modalities as prescribed   [] Call Physician     GOALS   Patient Goal(s): Patient goals : \"Full motion of elbow, weakness remedy\"    Short Term Goals Completed by 3 weeks Goal Status   STG 1 The pt will have decreased L elbow pain </= 1/10 in order to increase ease with ADL's In progress       Long Term Goals Completed by 8 weeks Goal Status   LTG 1 The pt will be indep/compliant with HEP in order to self-manage symptoms upon D/C In progress   LTG 2 The pt will have a increase in UEFI score >/=60/80 points in order to increase functional activity tolerance In progress   LTG 3 The pt will demo improved L shoulder and wrist AROM WNL's L elbow AROM >/=-* in order to increase ease with ADL's In progress,Partially met   LTG 4 The pt will demonstrate improved L UE strength >/=4/5 in order to lift/carry with decreased pain (when able to perform strengthening per MD) In progress   LTG 5 The pt will demo improved L  and pinch strength WNL's of the R in order to increase ease with opening jars and driving when appropriate In progress            Plan:  Frequency/Duration:  Plan  Plan Frequency: 1-2  Plan weeks: 8  Specific Instructions for Next Treatment: Possible DC Next Visit. Current Treatment Recommendations: Strengthening,ROM,Neuromuscular re-education,Manual Therapy - Soft Tissue Mobilization,Modalities,Patient/Caregiver education & training,Home exercise program,Therapeutic activities  Plan Comment: cont 1x per week  Pt to continue current HEP. See objective section for any therapeutic exercise changes, additions or modifications this date.     Therapy Time:      PT Individual Minutes  Time In: 8631  Time Out: 0293  Minutes: 50  Timed Code Treatment Minutes: 50 Minutes  Procedure Minutes:0  Timed Activity Minutes Units   Ther Ex 50 3     Electronically signed by Barrett Almaguer PTA on 7/7/22 at 11:41 AM EDT

## 2022-07-08 LAB — THYROID PEROXIDASE (TPO) ABS: 250 IU/ML (ref 0–25)

## 2022-07-11 ENCOUNTER — HOSPITAL ENCOUNTER (OUTPATIENT)
Dept: ORTHOPEDIC SURGERY | Age: 57
Discharge: HOME | End: 2022-07-13
Payer: COMMERCIAL

## 2022-07-11 ENCOUNTER — OFFICE VISIT (OUTPATIENT)
Dept: ORTHOPEDIC SURGERY | Age: 57
End: 2022-07-11

## 2022-07-11 VITALS
HEIGHT: 66 IN | HEART RATE: 66 BPM | WEIGHT: 214 LBS | OXYGEN SATURATION: 96 % | TEMPERATURE: 97 F | BODY MASS INDEX: 34.39 KG/M2

## 2022-07-11 DIAGNOSIS — S42.402A CLOSED FRACTURE DISLOCATION OF LEFT ELBOW, INITIAL ENCOUNTER: Primary | ICD-10-CM

## 2022-07-11 DIAGNOSIS — S42.402A CLOSED FRACTURE DISLOCATION OF LEFT ELBOW, INITIAL ENCOUNTER: ICD-10-CM

## 2022-07-11 PROCEDURE — G8427 DOCREV CUR MEDS BY ELIG CLIN: HCPCS | Performed by: ORTHOPAEDIC SURGERY

## 2022-07-11 PROCEDURE — 99214 OFFICE O/P EST MOD 30 MIN: CPT | Performed by: ORTHOPAEDIC SURGERY

## 2022-07-11 PROCEDURE — 3017F COLORECTAL CA SCREEN DOC REV: CPT | Performed by: ORTHOPAEDIC SURGERY

## 2022-07-11 PROCEDURE — 1036F TOBACCO NON-USER: CPT | Performed by: ORTHOPAEDIC SURGERY

## 2022-07-11 PROCEDURE — 73070 X-RAY EXAM OF ELBOW: CPT | Performed by: ORTHOPAEDIC SURGERY

## 2022-07-11 PROCEDURE — G8417 CALC BMI ABV UP PARAM F/U: HCPCS | Performed by: ORTHOPAEDIC SURGERY

## 2022-07-11 PROCEDURE — 73070 X-RAY EXAM OF ELBOW: CPT

## 2022-07-11 NOTE — PROGRESS NOTES
Subjective:      Patient ID: Jorge Luis Leal is a 61 y.o. female who presents today for:  Chief Complaint   Patient presents with    Follow-up     Left elbow dislocation. HPI  Patient progressing well and working with physical therapy. Overall she feels she has reached somewhat of a plateau but significantly happy with the range of motion that she has achieved up at this point. Denies any new trauma or falls or numbness or tingling of the left upper extremity. No past medical history on file. No past surgical history on file. Social History     Socioeconomic History    Marital status:      Spouse name: Not on file    Number of children: Not on file    Years of education: Not on file    Highest education level: Not on file   Occupational History    Not on file   Tobacco Use    Smoking status: Never Smoker    Smokeless tobacco: Never Used   Vaping Use    Vaping Use: Never used   Substance and Sexual Activity    Alcohol use: Yes     Alcohol/week: 0.0 standard drinks     Comment: 1 glass per week    Drug use: No    Sexual activity: Not on file   Other Topics Concern    Not on file   Social History Narrative    Not on file     Social Determinants of Health     Financial Resource Strain: Low Risk     Difficulty of Paying Living Expenses: Not hard at all   Food Insecurity: No Food Insecurity    Worried About [address] in the Last Year: Never true    [address] in the Last Year: Never true   Transportation Needs:     Lack of Transportation (Medical): Not on file    Lack of Transportation (Non-Medical):  Not on file   Physical Activity:     Days of Exercise per Week: Not on file    Minutes of Exercise per Session: Not on file   Stress:     Feeling of Stress : Not on file   Social Connections:     Frequency of Communication with Friends and Family: Not on file    Frequency of Social Gatherings with Friends and Family: Not on file    Attends Alevism Services: Not on file    Active Member of Clubs or Organizations: Not on file    Attends Club or Organization Meetings: Not on file    Marital Status: Not on file   Intimate Partner Violence:     Fear of Current or Ex-Partner: Not on file    Emotionally Abused: Not on file    Physically Abused: Not on file    Sexually Abused: Not on file   Housing Stability:     Unable to Pay for Housing in the Last Year: Not on file    Number of [place] in the Last Year: Not on file    Unstable Housing in the Last Year: Not on file     Family History   Problem Relation Age of Onset    Other Mother     Other Father      Allergies   Allergen Reactions    Penicillins Anaphylaxis     Current Outpatient Medications on File Prior to Visit   Medication Sig Dispense Refill    levothyroxine (SYNTHROID) 25 MCG tablet Take 1 tablet by mouth daily 30 tablet 1    hydrOXYzine HCl (ATARAX) 10 MG tablet Take 1 tablet by mouth 3 times daily as needed for Anxiety 30 tablet 0    ALPRAZolam (XANAX XR) 0.5 MG extended release tablet TAKE 1 TABLET BY MOUTH EVERY 8 HOURS AS NEEDED FOR ANXIETY. DO NOT CRUSH, CHEW, SPLIT      acetaminophen (TYLENOL) 500 MG tablet Take 500 mg by mouth every 6 hours as needed for Pain       No current facility-administered medications on file prior to visit. Review of Systems      Objective:   Pulse 66   Temp 97 °F (36.1 °C) (Temporal)   Ht 5' 6\" (1.676 m)   Wt 214 lb (97.1 kg)   SpO2 96%   BMI 34.54 kg/m²     Ortho Exam  Left upper extremity: Well-healed surgical incision appreciated about the posterior lateral aspect of the left elbow. Patient able obtain approximately 40 degrees from full extension to approximately 130 degrees of flexion. Patient has pronation and supination at the elbow. Neurovascular intact distally.     Radiographs and Laboratory Studies:     Diagnostic Imaging Studies:    AP and lateral imaging of the left elbow was obtained on 7/11/2022 to evaluate for maintenance of have the medicine team follow throughout their stay. Acute postoperative blood loss anemia after joint replacement being monitored with daily hemoglobin/hematocrit. The patients dressing was changed/incision was checked on day of d/c. The patient will be discharged at this time to  Home with [address] [place] [address] with their current diet restrictions and will continue to follow the hip precautions outlined to them by us and PT/OT. Condition on Discharge: Stable    Plan  Followup at scheduled appointment time (1 month post-op). Patient was instructed on the use of pain medications, the signs and symptoms of infection, and was given our number to call should they have any questions or concerns following discharge. reduction of ulnohumeral joint and radiocapitellar joint    Imaging demonstrates hinged internal fixator in place with possible loosening appreciated about the medial epicondyle but otherwise intact with no evidence of hardware backout or failure. Ulnohumeral joint and radiocapitellar joint remained reduced    Laboratory Studies:   Lab Results   Component Value Date    WBC 4.2 (L) 07/05/2022    HGB 13.7 07/05/2022    HCT 42.1 07/05/2022    MCV 89.0 07/05/2022     07/05/2022     No results found for: SEDRATE  No results found for: CRP    Assessment:       Diagnosis Orders   1. Closed fracture dislocation of left elbow, initial encounter  XR ELBOW LEFT (2 VIEWS)          Plan:     Patient approximately 2 months after undergoing open reduction and internal stabilization of complex left elbow dislocation. Recommend return to office in 4 weeks time for repeat clinical examination and radiographs. Patient should continue to maximize all range of motion in the left elbow as tolerated. Continue recommend no lifting pulling or pushing with the left upper extremity at this juncture. Pending radiographic and clinical examination for aches time we will plan operative intervention in the form of removal of the internal fixator. Orders Placed This Encounter   Procedures    XR ELBOW LEFT (2 VIEWS)     Standing Status:   Future     Number of Occurrences:   1     Standing Expiration Date:   7/11/2023     No orders of the defined types were placed in this encounter. No follow-ups on file.       Charo Galvez MD

## 2022-07-13 ENCOUNTER — HOSPITAL ENCOUNTER (OUTPATIENT)
Dept: PHYSICAL THERAPY | Age: 57
Setting detail: THERAPIES SERIES
Discharge: HOME | End: 2022-07-13
Payer: COMMERCIAL

## 2022-07-13 PROCEDURE — 97140 MANUAL THERAPY 1/> REGIONS: CPT

## 2022-07-13 PROCEDURE — 97110 THERAPEUTIC EXERCISES: CPT

## 2022-07-13 NOTE — PROGRESS NOTES
[address]  PHYSICAL THERAPY PLAN OF CARE   [address], [address] [facility]         Ph: [phone]  Fax: [phone]    [] Certification  [] Recertification []  Plan of Care  [] Progress Note [x] Discharge      Referring Provider: Tony Gonsalez MD     From:  Dimitri Vang, PT , DPT  Patient: Alexei Boles (44 y.o. female) : 1965 Date: 2022  Medical Diagnosis: Unspecified fracture of lower end of left humerus, initial encounter for closed fracture [S42.402A]    Treatment Diagnosis: decreased L shoulder, elbow, and wrist AROM, decreased L UE and hand strength, impaired fine motor control, impaired functional activity tolerance, L UE pain and edema    Progress Report Period from:  2022  to 2022    Visits to Date: 8 No Show: 0 Cancelled Appts: 0    OBJECTIVE:   Short Term Goals - Time Frame for Short term goals: 3 weeks    Goals Current/Discharge status  Status   Short term goal 1: The pt will have decreased L elbow pain </= 1/10 in order to increase ease with ADL's  0/10 Met     Long Term Goals - Time Frame for Long term goals : 8 weeks  Goals Current/ Discharge status Status   Long term goal 1: The pt will be indep/compliant with HEP in order to self-manage symptoms upon D/C Indep/compliant  Met   Long term goal 2: The pt will have a increase in UEFI score >/=60/80 points in order to increase functional activity tolerance 75/80 Met   Long term goal 3: The pt will demo improved L shoulder and wrist AROM WNL's L elbow AROM >/=-* in order to increase ease with ADL's    AROM LUE (degrees)  L Shoulder Flexion 0-180: 155  L Shoulder ABduction 0-180: 150  L Shoulder Int Rotation  0-70: L1  L Shoulder Ext Rotation  0-90: top of occiput-limited d/t elbow mobility  L Elbow Flexion 0-145: 125  L Elbow Extension 145-0: -20 from neutral  L Forearm Pron 0-90: WNL's  L Forearm Supination  0-90: WNL's  L Wrist Flexion 0-80: 80  L Wrist Extension 0-70: 40  L Wrist at 10:21 AM EDT    If you have any questions or concerns, please don't hesitate to call. Thank you for your referral.    I have reviewed this plan of care and certify a need for medically necessary rehabilitation services.     Physician Signature:__________________________________________________________  Date:  Please sign and return

## 2022-07-13 NOTE — PROGRESS NOTES
[address] [facility]  Outpatient Physical Therapy    Treatment Note        Date: 2022  Patient: Jitendra Call  : 1965   Confirmed: Yes  MRN: [id number]  Referring Provider: Dylon Rashid MD   Secondary Referring Provider (If applicable):     Medical Diagnosis: Unspecified fracture of lower end of left humerus, initial encounter for closed fracture [S42.402A]    Treatment Diagnosis: decreased L shoulder, elbow, and wrist AROM, decreased L UE and hand strength, impaired fine motor control, impaired functional activity tolerance, L UE pain and edema    Visit Information:  Insurance: Payor: Sean Bueno / Plan: Sean Bueno / Product Type: *No Product type* /   PT Visit Information  Onset Date: 22  Total # of Visits Approved: 21 (Eval +20V)  Total # of Visits to Date: 8  No Show: 0  Canceled Appointment: 0  Progress Note Counter: -    Subjective Information:  Subjective: Pt reports feeling 98% functional with contd difficulty squeezing, swimming, and lifting/carrying objects. Plans to possibly have hardware removal in late August.  HEP Compliance:  [x] Good [] Fair [] Poor [] Reports not doing due to:    Pain Screening  Patient Currently in Pain: Denies    Treatment:  Exercises:  Exercises  Exercise 1: Red clips placing/reaching on pole (x10) x2  Exercise 4: pulleys flexion and scaption x 3 mins ea. Exercise 9: power web finger spred (yellow/green) 5\"x20  Exercise 11: Red digiflex x 15  whole hand, 10 each individual finger w/ 20 on ring and pinky finger together for irradiation of muscle firing. Exercise 12: [employer] work station with reaching overhead removing triangle and placing overhead and return x1  Treatment Reasoning  Limitations addressed: Mobility,Strength    *Indicates exercise, modality, or manual techniques to be initiated when appropriate    Objective Measures:     Strength: [] NT  [x] MMT completed:   Other:  (2rd rung avergae of 3) L 15# pinch strength; pinch strength key  11# palmar grasp 8#  Strength LUE  L Shoulder Flexion: 4+/5  L Shoulder ABduction: 4+/5  L Shoulder Internal Rotation: 4/5  L Shoulder External Rotation: 4+/5  L Elbow Flexion: 4/5  L Elbow Extension: 4/5  L Forearm Pron: 4+/5  L Forearm Sup: 4+/5  L Wrist Flexion: 4/5  L Wrist Extension: 4/5  L Wrist Radial Deviation: 4+/5  L Wrist Ulnar Deviation: 4+/5    ROM: [] NT  [x] ROM measurements:  AROM LUE (degrees)  L Shoulder Flexion 0-180: 155  L Shoulder ABduction 0-180: 150  L Shoulder Int Rotation  0-70: L1  L Shoulder Ext Rotation  0-90: top of occiput-limited d/t elbow mobility  L Elbow Flexion 0-145: 125  L Elbow Extension 145-0: -20 from neutral  L Forearm Pron 0-90: WNL's  L Forearm Supination  0-90: WNL's  L Wrist Flexion 0-80: 80  L Wrist Extension 0-70: 40  L Wrist Radial Deviation 0-20: 28  L Wrist Ulnar Deviation 0-45: 40     Balance: [x] NT    Observations: [x] NT    Assessment: Body Structures, Functions, Activity Limitations Requiring Skilled Therapeutic Intervention: Decreased functional mobility ,Decreased ADL status,Decreased ROM,Decreased strength,Increased pain,Decreased posture,Decreased high-level IADLs,Decreased fine motor control,Decreased coordination  Assessment: Pt with very good progress towards goals with primary contd limitations in L elbow extension, end range shoulder flex, abd, and ER, and wrist ext AROM. Pt demos good strength and fucntion of the L UE despite contd ROM limitations. Pt is currently indep/compliant with HEP in order to self manage these ongoing deficits until hardwaare removal surgery at the end of August. D/C further PT.   Treatment Diagnosis: decreased L shoulder, elbow, and wrist AROM, decreased L UE and hand strength, impaired fine motor control, impaired functional activity tolerance, L UE pain and edema  Therapy Prognosis: Good,Fair     Patient Education: [x] NA     Post-Pain Assessment:       Pain Rating (0-10 pain scale):   0/10   Location and pain description same as pre-treatment unless indicated. Action: [] NA   [x] Perform HEP  [] Meds as prescribed  [] Modalities as prescribed   [] Call Physician     GOALS   Patient Goal(s): Patient goals : \"Full motion of elbow, weakness remedy\"    Short Term Goals Completed by 3 weeks Goal Status   STG 1 The pt will have decreased L elbow pain </= 1/10 in order to increase ease with ADL's Met     Long Term Goals Completed by 8 weeks Goal Status   LTG 1 The pt will be indep/compliant with HEP in order to self-manage symptoms upon D/C Met   LTG 2 The pt will have a increase in UEFI score >/=60/80 points in order to increase functional activity tolerance Met   LTG 3 The pt will demo improved L shoulder and wrist AROM WNL's L elbow AROM >/=-* in order to increase ease with ADL's Partially met   LTG 4 The pt will demonstrate improved L UE strength >/=4/5 in order to lift/carry with decreased pain (when able to perform strengthening per MD) Met   LTG 5 The pt will demo improved L  and pinch strength WNL's of the R in order to increase ease with opening jars and driving when appropriate Partially met     Plan:  Frequency/Duration:  Plan  Plan Comment: D/C  Pt to continue current HEP. See objective section for any therapeutic exercise changes, additions or modifications this date.     Therapy Time:      PT Individual Minutes  Time In: 0292  Time Out: 4412  Minutes: 48  Timed Code Treatment Minutes: 48 Minutes  Procedure Minutes: 0  Timed Activity Minutes Units   Ther Ex 36 2   Manual (objective measures) 12 1     Electronically signed by Marylu Campbell PT on 7/13/22 at 10:31 AM EDT

## 2022-07-18 ENCOUNTER — HOSPITAL ENCOUNTER (OUTPATIENT)
Dept: WOMENS IMAGING | Age: 57
Discharge: HOME | End: 2022-07-20
Payer: COMMERCIAL

## 2022-07-18 DIAGNOSIS — Z12.31 SCREENING MAMMOGRAM FOR BREAST CANCER: ICD-10-CM

## 2022-07-18 PROCEDURE — 77067 SCR MAMMO BI INCL CAD: CPT

## 2022-07-26 NOTE — RESULT ENCOUNTER NOTE
Please inform patient that her recent screening mammogram was reported as benign with no suspicious findings compared to previous imaging.

## 2022-08-08 ENCOUNTER — HOSPITAL ENCOUNTER (OUTPATIENT)
Dept: ORTHOPEDIC SURGERY | Age: 57
Discharge: HOME | End: 2022-08-10
Payer: COMMERCIAL

## 2022-08-08 ENCOUNTER — OFFICE VISIT (OUTPATIENT)
Dept: ORTHOPEDIC SURGERY | Age: 57
End: 2022-08-08
Payer: COMMERCIAL

## 2022-08-08 VITALS
HEIGHT: 66 IN | BODY MASS INDEX: 34.39 KG/M2 | HEART RATE: 73 BPM | TEMPERATURE: 97 F | WEIGHT: 214 LBS | OXYGEN SATURATION: 95 %

## 2022-08-08 DIAGNOSIS — S42.402A CLOSED FRACTURE DISLOCATION OF LEFT ELBOW, INITIAL ENCOUNTER: Primary | ICD-10-CM

## 2022-08-08 DIAGNOSIS — S42.402A CLOSED FRACTURE DISLOCATION OF LEFT ELBOW, INITIAL ENCOUNTER: ICD-10-CM

## 2022-08-08 PROCEDURE — 3017F COLORECTAL CA SCREEN DOC REV: CPT | Performed by: ORTHOPAEDIC SURGERY

## 2022-08-08 PROCEDURE — 1036F TOBACCO NON-USER: CPT | Performed by: ORTHOPAEDIC SURGERY

## 2022-08-08 PROCEDURE — G8427 DOCREV CUR MEDS BY ELIG CLIN: HCPCS | Performed by: ORTHOPAEDIC SURGERY

## 2022-08-08 PROCEDURE — 99214 OFFICE O/P EST MOD 30 MIN: CPT | Performed by: ORTHOPAEDIC SURGERY

## 2022-08-08 PROCEDURE — G8417 CALC BMI ABV UP PARAM F/U: HCPCS | Performed by: ORTHOPAEDIC SURGERY

## 2022-08-08 PROCEDURE — 73070 X-RAY EXAM OF ELBOW: CPT

## 2022-08-08 PROCEDURE — 73070 X-RAY EXAM OF ELBOW: CPT | Performed by: ORTHOPAEDIC SURGERY

## 2022-08-08 NOTE — PROGRESS NOTES
Subjective:      Patient ID: Alexei Boles is a 61 y.o. female who presents today for:  Chief Complaint   Patient presents with    Follow-up     Left closed elbow fracture. The patient states that her elbow is doing well. HPI  Patient's range of motion of the left elbow continues to improve. Denies any new trauma or falls and is anxious to have hardware out of her left elbow. No past medical history on file. No past surgical history on file. Social History     Socioeconomic History    Marital status:      Spouse name: Not on file    Number of children: Not on file    Years of education: Not on file    Highest education level: Not on file   Occupational History    Not on file   Tobacco Use    Smoking status: Never    Smokeless tobacco: Never   Vaping Use    Vaping Use: Never used   Substance and Sexual Activity    Alcohol use:  Yes     Alcohol/week: 0.0 standard drinks     Comment: 1 glass per week    Drug use: No    Sexual activity: Not on file   Other Topics Concern    Not on file   Social History Narrative    Not on file     Social Determinants of Health     Financial Resource Strain: Low Risk     Difficulty of Paying Living Expenses: Not hard at all   Food Insecurity: No Food Insecurity    Worried About Running Out of Food in the Last Year: Never true    Ran Out of Food in the Last Year: Never true   Transportation Needs: Not on file   Physical Activity: Not on file   Stress: Not on file   Social Connections: Not on file   Intimate Partner Violence: Not on file   Housing Stability: Not on file     Family History   Problem Relation Age of Onset    Other Mother     Other Father      Allergies   Allergen Reactions    Penicillins Anaphylaxis     Current Outpatient Medications on File Prior to Visit   Medication Sig Dispense Refill    levothyroxine (SYNTHROID) 25 MCG tablet Take 1 tablet by mouth daily 30 tablet 1    hydrOXYzine HCl (ATARAX) 10 MG tablet Take 1 tablet by mouth 3 times daily as needed for Anxiety 30 tablet 0    ALPRAZolam (XANAX XR) 0.5 MG extended release tablet TAKE 1 TABLET BY MOUTH EVERY 8 HOURS AS NEEDED FOR ANXIETY. DO NOT CRUSH, CHEW, SPLIT      acetaminophen (TYLENOL) 500 MG tablet Take 500 mg by mouth every 6 hours as needed for Pain       No current facility-administered medications on file prior to visit. Review of Systems      Objective:   Pulse 73   Temp 97 °F (36.1 °C) (Temporal)   Ht 5' 6\" (1.676 m)   Wt 214 lb (97.1 kg)   SpO2 95%   BMI 34.54 kg/m²     Ortho Exam  Left upper extremity:  Skin: Intact circumferentially with well-healed surgical incision about the lateral aspect of the left elbow, no swelling, ecchymosis or edema is appreciated  Neuro: Sensation intact light touch in the radial, ulnar and median nerve distribution. Able to perform all cardinal hand movements. Vascular: Strong palpable radial pulse, brisk cap refill in all digits. MSK: Patient able obtain approximately 20 degrees in full extension and approximately 130 degrees of flexion. No pain with pronation or supination of the elbow with full supination and pronation appreciated. Radiographs and Laboratory Studies:     Diagnostic Imaging Studies:    AP and lateral views of the left elbow were obtained on 8/8/2022 to evaluate for maintenance of reduction of ulnohumeral joint. Radiographs demonstrate maintained articulation of the ulnohumeral joint with no evidence of failure of the internal hinged fixator. Laboratory Studies:   Lab Results   Component Value Date    WBC 4.2 (L) 07/05/2022    HGB 13.7 07/05/2022    HCT 42.1 07/05/2022    MCV 89.0 07/05/2022     07/05/2022     No results found for: SEDRATE  No results found for: CRP    Assessment:       Diagnosis Orders   1.  Closed fracture dislocation of left elbow, initial encounter  XR ELBOW LEFT (2 VIEWS)             Plan:     Progressive healing left elbow status post left elbow dislocation and placement of hinged General  Referring Family Doctor: Sherita Sifuentes MD   PAT  [x] [facility] PAT Date/Time:                                                            [x] History & Physical [] Physician will Provide [] Attached [] Dictated [] Other  [x] Follow Anesthesia Pre-Op Orders for X-rays, [facility]     [x] SN & PT to evaluate and treat/educate disease management, medications, home safety & equipment needs for total joint patients  [] Other: ____________________________________________________  Consults: Medical/Cardiac Clearance done by  ____________________  PRE-OP ORDERS:   Allergies: Penicillins Latex Allergies:             Diabetic:           [] IV ________________________  [x] IV Start with J-loop     Preprinted Orders: Attached [] Yes [] No   ANTIBIOTIC PRE-OP: [x] ANCEF 2 gram IVPB if > 120 kg 3 grams IVPB within 1 hour of incision, if ALLERGIC, use VANCOMYCIN 1 gram IV, 2 hours pre-op  [] TXA Protocol [] Other:   [x] NPO   [] Betablocker (if needed) _____________________________________   [] Knee high anti-embolic hose [] Thigh high anti-embolic hose   Other: ______________________________________________________    Physician Signature Required: Electronically signed by Hugh Sheth MD on 8/8/2022 at 2:06 PM   Date/Time: 8/8/2022

## 2022-08-22 ENCOUNTER — HOSPITAL ENCOUNTER (OUTPATIENT)
Dept: PREADMISSION TESTING | Age: 57
Discharge: HOME | End: 2022-08-26
Payer: COMMERCIAL

## 2022-08-22 VITALS
SYSTOLIC BLOOD PRESSURE: 144 MMHG | OXYGEN SATURATION: 98 % | RESPIRATION RATE: 16 BRPM | WEIGHT: 211.6 LBS | BODY MASS INDEX: 35.25 KG/M2 | DIASTOLIC BLOOD PRESSURE: 71 MMHG | HEIGHT: 65 IN | HEART RATE: 60 BPM | TEMPERATURE: 98.5 F

## 2022-08-22 LAB
HCT VFR BLD CALC: 40.9 % (ref 37–47)
HEMOGLOBIN: 13.8 G/DL (ref 12–16)
MCH RBC QN AUTO: 29.6 PG (ref 27–31.3)
MCHC RBC AUTO-ENTMCNC: 33.9 % (ref 33–37)
MCV RBC AUTO: 87.4 FL (ref 82–100)
PDW BLD-RTO: 14.3 % (ref 11.5–14.5)
PLATELET # BLD: 222 K/UL (ref 130–400)
RBC # BLD: 4.68 M/UL (ref 4.2–5.4)
WBC # BLD: 3.7 K/UL (ref 4.8–10.8)

## 2022-08-22 PROCEDURE — 85027 COMPLETE CBC AUTOMATED: CPT

## 2022-08-22 PROCEDURE — 93005 ELECTROCARDIOGRAM TRACING: CPT | Performed by: NURSE PRACTITIONER

## 2022-08-22 RX ORDER — SODIUM CHLORIDE 9 MG/ML
INJECTION, SOLUTION INTRAVENOUS PRN
Status: CANCELLED | OUTPATIENT
Start: 2022-08-29

## 2022-08-22 RX ORDER — SODIUM CHLORIDE 0.9 % (FLUSH) 0.9 %
5-40 SYRINGE (ML) INJECTION EVERY 12 HOURS SCHEDULED
Status: CANCELLED | OUTPATIENT
Start: 2022-08-29

## 2022-08-22 RX ORDER — SODIUM CHLORIDE 0.9 % (FLUSH) 0.9 %
5-40 SYRINGE (ML) INJECTION PRN
Status: CANCELLED | OUTPATIENT
Start: 2022-08-29

## 2022-08-22 RX ORDER — SODIUM CHLORIDE, SODIUM LACTATE, POTASSIUM CHLORIDE, CALCIUM CHLORIDE 600; 310; 30; 20 MG/100ML; MG/100ML; MG/100ML; MG/100ML
INJECTION, SOLUTION INTRAVENOUS CONTINUOUS
Status: CANCELLED | OUTPATIENT
Start: 2022-08-29

## 2022-08-22 RX ORDER — LIDOCAINE HYDROCHLORIDE 10 MG/ML
1 INJECTION, SOLUTION EPIDURAL; INFILTRATION; INTRACAUDAL; PERINEURAL
Status: CANCELLED | OUTPATIENT
Start: 2022-08-29 | End: 2022-08-29

## 2022-08-22 ASSESSMENT — ENCOUNTER SYMPTOMS
STRIDOR: 0
SHORTNESS OF BREATH: 0
NAUSEA: 0
CHEST TIGHTNESS: 0
COUGH: 0
WHEEZING: 0
SORE THROAT: 0
ALLERGIC/IMMUNOLOGIC NEGATIVE: 1
VOMITING: 0
TROUBLE SWALLOWING: 0
EYES NEGATIVE: 1
DIARRHEA: 0
ABDOMINAL PAIN: 0
CONSTIPATION: 0
BACK PAIN: 0

## 2022-08-22 NOTE — H&P
Nurse Practitioner History and Physical      CHIEF COMPLAINT:  surgery on left elbow    HISTORY OF PRESENT ILLNESS:      The patient is a 61 y.o. female with significant past medical history of left elbow hardware who presents for removal hardware left elbow with possible contracture release & manipulation under anesthesia. States fall onto left elbow, tripping over a rug 5/2022 while in [place] & had fracture repair with hardware. There is some limitation with flexion & extension of joint. Recently has gained more motion with PT. Denies any pain, tingling , swelling. Hardware to be removed. Scheduled for OR. Past Medical History:        Diagnosis Date    Thyroid disease      Past Surgical History:    Past Surgical History:   Procedure Laterality Date    ELBOW SURGERY Left     5/2022 fracture repair  in [place]         Medications Prior to Admission:    Current Outpatient Medications   Medication Sig Dispense Refill    levothyroxine (SYNTHROID) 25 MCG tablet Take 1 tablet by mouth daily 30 tablet 1    ALPRAZolam (XANAX XR) 0.5 MG extended release tablet TAKE 1 TABLET BY MOUTH EVERY 8 HOURS AS NEEDED FOR ANXIETY. DO NOT CRUSH, CHEW, SPLIT      acetaminophen (TYLENOL) 500 MG tablet Take 500 mg by mouth every 6 hours as needed for Pain       No current facility-administered medications for this encounter. Allergies:  Penicillins    Social History:   Social History     Socioeconomic History    Marital status:      Spouse name: Not on file    Number of children: Not on file    Years of education: Not on file    Highest education level: Not on file   Occupational History    Not on file   Tobacco Use    Smoking status: Never    Smokeless tobacco: Never   Vaping Use    Vaping Use: Never used   Substance and Sexual Activity    Alcohol use:  Yes     Alcohol/week: 0.0 standard drinks     Comment: 1 glass per week    Drug use: No    Sexual activity: Not on file   Other Topics Concern    Not on file   Social History Narrative    Not on file     Social Determinants of Health     Financial Resource Strain: Low Risk     Difficulty of Paying Living Expenses: Not hard at all   Food Insecurity: No Food Insecurity    Worried About Running Out of Food in the Last Year: Never true    Ran Out of Food in the Last Year: Never true   Transportation Needs: Not on file   Physical Activity: Not on file   Stress: Not on file   Social Connections: Not on file   Intimate Partner Violence: Not on file   Housing Stability: Not on file       Family History:       Problem Relation Age of Onset    Asthma Mother     Heart Attack Mother     Other Mother     Cancer Father         pancreas cancer    Other Father     No Known Problems Daughter        Review of Systems   Constitutional: Negative. Negative for chills and fever. HENT:  Negative for congestion, sore throat, tinnitus and trouble swallowing. Eyes: Negative. Negative for visual disturbance. Respiratory:  Negative for cough, chest tightness, shortness of breath, wheezing and stridor. Cardiovascular:  Negative for chest pain and palpitations. Gastrointestinal:  Negative for abdominal pain, constipation, diarrhea, nausea and vomiting. Endocrine:        Hypothyroid. Genitourinary:  Negative for dysuria and frequency. Musculoskeletal:  Negative for back pain, myalgias and neck pain. Skin: Negative. Allergic/Immunologic: Negative. Neurological: Negative. Negative for dizziness, seizures and headaches. Hematological: Negative. Psychiatric/Behavioral: Negative. Vitals:  LMP 03/02/2016     Physical Exam  Constitutional:       Appearance: She is well-developed. She is obese. HENT:      Head: Normocephalic and atraumatic. Right Ear: Tympanic membrane, ear canal and external ear normal.      Left Ear: Tympanic membrane, ear canal and external ear normal.      Nose: No congestion.       Mouth/Throat:      Mouth: Mucous membranes are moist.   Eyes:

## 2022-08-23 ENCOUNTER — TELEPHONE (OUTPATIENT)
Dept: ORTHOPEDIC SURGERY | Age: 57
End: 2022-08-23

## 2022-08-23 NOTE — TELEPHONE ENCOUNTER
Surgery Scheduling and Authorization Information    Surgery Date:8/29/22  Surgery good through dates:   CPT Code(s) 21467  Procedure(s) (L) elbow removal hardware      Approved [] Not Required [x] Denied []  Reference # [id number]  Auth #    How authorization obtained:  [] web portal             [x] via phone       [x] Via fax    Provider  [x] Isauro Marlow  [] Silver Samayoa  [] Pablo Diehl  [] Farzana Feliz  [] Vale Irene  [] Kin Cockayne  [] Jesse Means  [] Patt Morrow  [] Hannah Shankar  [] Stephenie Lytton      Surgery Sheet Faxed to Scheduling [x]  Surgery and Prior Authorization Information Sheet Scanned [x]

## 2022-08-24 LAB
EKG ATRIAL RATE: 61 BPM
EKG P AXIS: 12 DEGREES
EKG P-R INTERVAL: 140 MS
EKG Q-T INTERVAL: 440 MS
EKG QRS DURATION: 84 MS
EKG QTC CALCULATION (BAZETT): 442 MS
EKG R AXIS: 55 DEGREES
EKG T AXIS: 38 DEGREES
EKG VENTRICULAR RATE: 61 BPM

## 2022-08-26 DIAGNOSIS — E03.9 HYPOTHYROIDISM, UNSPECIFIED TYPE: ICD-10-CM

## 2022-08-26 NOTE — TELEPHONE ENCOUNTER
Pharmacy is requesting medication refill.  Please approve or deny this request.    Rx requested:  Requested Prescriptions     Pending Prescriptions Disp Refills    levothyroxine (SYNTHROID) 25 MCG tablet [Pharmacy Med Name: LEVOTHYROXINE 25 MCG TABLET] 30 tablet 1     Sig: Take 1 tablet by mouth Daily         Last Office Visit:   7/5/2022      Next Visit Date:  Future Appointments   Date Time Provider Phill Sanders   9/12/2022  1:30 PM Yas Burton PA-C [address]

## 2022-08-28 ENCOUNTER — ANESTHESIA EVENT (OUTPATIENT)
Dept: OPERATING ROOM | Age: 57
End: 2022-08-28
Payer: COMMERCIAL

## 2022-08-28 NOTE — ANESTHESIA PRE PROCEDURE
Department of Anesthesiology  Preprocedure Note       Name:  Jose Carlos Roy   Age:  61 y.o.  :  1965                                          MRN:  [id number]         Date:  2022      Surgeon: Lebron Williamson):  Chika Mendez MD    Procedure: Procedure(s):  REMOVAL HARDWARE LEFT ELBOW WITH POSSIBLE CONTRACTURE RELEASE AND MANIPULATION UNDER ANESTHESIA  HODA FLAT TOP, LARGE C-ARM, SKELETAL DYNAMICS, IJS SYSTEM :  REP: Jacqulin Harada [phone]    Medications prior to admission:   Prior to Admission medications    Medication Sig Start Date End Date Taking? Authorizing Provider   levothyroxine (SYNTHROID) 25 MCG tablet Take 1 tablet by mouth daily 22   Santi Dumont MD   ALPRAZolam (XANAX XR) 0.5 MG extended release tablet TAKE 1 TABLET BY MOUTH EVERY 8 HOURS AS NEEDED FOR ANXIETY. DO NOT CRUSH, CHEW, SPLIT 22   Historical Provider, MD   acetaminophen (TYLENOL) 500 MG tablet Take 500 mg by mouth every 6 hours as needed for Pain    Historical Provider, MD       Current medications:    No current facility-administered medications for this encounter. Current Outpatient Medications   Medication Sig Dispense Refill    levothyroxine (SYNTHROID) 25 MCG tablet Take 1 tablet by mouth daily 30 tablet 1    ALPRAZolam (XANAX XR) 0.5 MG extended release tablet TAKE 1 TABLET BY MOUTH EVERY 8 HOURS AS NEEDED FOR ANXIETY. DO NOT CRUSH, CHEW, SPLIT      acetaminophen (TYLENOL) 500 MG tablet Take 500 mg by mouth every 6 hours as needed for Pain         Allergies:     Allergies   Allergen Reactions    Penicillins Other (See Comments)     As baby -- patient has no recall       Problem List:    Patient Active Problem List   Diagnosis Code    Closed fracture dislocation of left elbow joint S45.46A       Past Medical History:        Diagnosis Date    Thyroid disease        Past Surgical History:        Procedure Laterality Date    ELBOW SURGERY Left     2022 fracture repair  in [facility] History:    Social History     Tobacco Use    Smoking status: Never    Smokeless tobacco: Never   Substance Use Topics    Alcohol use: Yes     Alcohol/week: 0.0 standard drinks     Comment: 1 glass per week                                Counseling given: Not Answered      Vital Signs (Current): There were no vitals filed for this visit. BP Readings from Last 3 Encounters:   08/22/22 (!) 144/71   07/05/22 124/86   04/22/16 140/84       NPO Status:                                                                                 BMI:   Wt Readings from Last 3 Encounters:   08/22/22 211 lb 9.6 oz (96 kg)   08/08/22 214 lb (97.1 kg)   07/11/22 214 lb (97.1 kg)     There is no height or weight on file to calculate BMI.    CBC:   Lab Results   Component Value Date/Time    WBC 3.7 08/22/2022 03:38 PM    RBC 4.68 08/22/2022 03:38 PM    HGB 13.8 08/22/2022 03:38 PM    HCT 40.9 08/22/2022 03:38 PM    MCV 87.4 08/22/2022 03:38 PM    RDW 14.3 08/22/2022 03:38 PM     08/22/2022 03:38 PM       CMP: No results found for: NA, K, CL, CO2, BUN, CREATININE, GFRAA, AGRATIO, LABGLOM, GLUCOSE, GLU, PROT, CALCIUM, BILITOT, ALKPHOS, AST, ALT    POC Tests: No results for input(s): POCGLU, POCNA, POCK, POCCL, POCBUN, POCHEMO, POCHCT in the last 72 hours.     Coags: No results found for: PROTIME, INR, APTT    HCG (If Applicable): No results found for: PREGTESTUR, PREGSERUM, HCG, HCGQUANT     ABGs: No results found for: PHART, PO2ART, VRT7CJT, XWI1FLZ, BEART, U0HLXMMY     Type & Screen (If Applicable):  No results found for: LABABO, LABRH    Drug/Infectious Status (If Applicable):  Lab Results   Component Value Date/Time    HEPCAB NONREACTIVE 07/05/2022 08:52 AM       COVID-19 Screening (If Applicable): No results found for: COVID19        Anesthesia Evaluation  Patient summary reviewed and Nursing notes reviewed no history of anesthetic complications:   Airway: Mallampati: IV  TM distance: >3 FB   Neck ROM: full  Mouth opening: > = 3 FB   Dental:          Pulmonary:Negative Pulmonary ROS breath sounds clear to auscultation                             Cardiovascular:Negative CV ROS  Exercise tolerance: good (>4 METS),         ECG reviewed  Rhythm: regular  Rate: normal           Beta Blocker:  Not on Beta Blocker      ROS comment: EKG 8/22/22:  Normal sinus rhythm  Normal ECG     Neuro/Psych:   (+) depression/anxiety             GI/Hepatic/Renal: Neg GI/Hepatic/Renal ROS            Endo/Other:    (+) hypothyroidism::., .          Pt had PAT visit. Abdominal:             Vascular: negative vascular ROS. Other Findings:           Anesthesia Plan      general and regional     ASA 2     (ETT  L Supraclavicular block)  Induction: intravenous. MIPS: Prophylactic antiemetics administered. Anesthetic plan and risks discussed with patient.         Attending anesthesiologist reviewed and agrees with Preprocedure content      Post-op pain plan if not by surgeon: khushbu Ceballos DO   8/28/2022

## 2022-08-29 ENCOUNTER — ANESTHESIA (OUTPATIENT)
Dept: OPERATING ROOM | Age: 57
End: 2022-08-29
Payer: COMMERCIAL

## 2022-08-29 ENCOUNTER — APPOINTMENT (OUTPATIENT)
Dept: GENERAL RADIOLOGY | Age: 57
End: 2022-08-29
Attending: ORTHOPAEDIC SURGERY
Payer: COMMERCIAL

## 2022-08-29 ENCOUNTER — HOSPITAL ENCOUNTER (OUTPATIENT)
Age: 57
Setting detail: OUTPATIENT SURGERY
Discharge: HOSPICE | End: 2022-08-29
Attending: ORTHOPAEDIC SURGERY | Admitting: ORTHOPAEDIC SURGERY
Payer: COMMERCIAL

## 2022-08-29 VITALS
WEIGHT: 211 LBS | HEIGHT: 65 IN | HEART RATE: 72 BPM | DIASTOLIC BLOOD PRESSURE: 81 MMHG | BODY MASS INDEX: 35.16 KG/M2 | SYSTOLIC BLOOD PRESSURE: 161 MMHG | RESPIRATION RATE: 16 BRPM | OXYGEN SATURATION: 100 % | TEMPERATURE: 96.5 F

## 2022-08-29 DIAGNOSIS — S42.402D CLOSED FRACTURE DISLOCATION OF LEFT ELBOW WITH ROUTINE HEALING, SUBSEQUENT ENCOUNTER: Primary | ICD-10-CM

## 2022-08-29 PROCEDURE — 3600000014 HC SURGERY LEVEL 4 ADDTL 15MIN: Performed by: ORTHOPAEDIC SURGERY

## 2022-08-29 PROCEDURE — 2580000003 HC RX 258: Performed by: ORTHOPAEDIC SURGERY

## 2022-08-29 PROCEDURE — 3700000001 HC ADD 15 MINUTES (ANESTHESIA): Performed by: ORTHOPAEDIC SURGERY

## 2022-08-29 PROCEDURE — 3209999900 FLUORO FOR SURGICAL PROCEDURES

## 2022-08-29 PROCEDURE — 6360000002 HC RX W HCPCS: Performed by: NURSE PRACTITIONER

## 2022-08-29 PROCEDURE — 3600000004 HC SURGERY LEVEL 4 BASE: Performed by: ORTHOPAEDIC SURGERY

## 2022-08-29 PROCEDURE — 2580000003 HC RX 258: Performed by: NURSE PRACTITIONER

## 2022-08-29 PROCEDURE — 2709999900 HC NON-CHARGEABLE SUPPLY: Performed by: ORTHOPAEDIC SURGERY

## 2022-08-29 PROCEDURE — 64415 NJX AA&/STRD BRCH PLXS IMG: CPT | Performed by: STUDENT IN AN ORGANIZED HEALTH CARE EDUCATION/TRAINING PROGRAM

## 2022-08-29 PROCEDURE — 3700000000 HC ANESTHESIA ATTENDED CARE: Performed by: ORTHOPAEDIC SURGERY

## 2022-08-29 PROCEDURE — 2500000003 HC RX 250 WO HCPCS: Performed by: STUDENT IN AN ORGANIZED HEALTH CARE EDUCATION/TRAINING PROGRAM

## 2022-08-29 PROCEDURE — 7100000010 HC PHASE II RECOVERY - FIRST 15 MIN: Performed by: ORTHOPAEDIC SURGERY

## 2022-08-29 PROCEDURE — 20680 REMOVAL OF IMPLANT DEEP: CPT | Performed by: ORTHOPAEDIC SURGERY

## 2022-08-29 PROCEDURE — A4217 STERILE WATER/SALINE, 500 ML: HCPCS | Performed by: ORTHOPAEDIC SURGERY

## 2022-08-29 PROCEDURE — 7100000011 HC PHASE II RECOVERY - ADDTL 15 MIN: Performed by: ORTHOPAEDIC SURGERY

## 2022-08-29 PROCEDURE — 7100000001 HC PACU RECOVERY - ADDTL 15 MIN: Performed by: ORTHOPAEDIC SURGERY

## 2022-08-29 PROCEDURE — 6360000002 HC RX W HCPCS: Performed by: STUDENT IN AN ORGANIZED HEALTH CARE EDUCATION/TRAINING PROGRAM

## 2022-08-29 PROCEDURE — 7100000000 HC PACU RECOVERY - FIRST 15 MIN: Performed by: ORTHOPAEDIC SURGERY

## 2022-08-29 RX ORDER — SODIUM CHLORIDE 9 MG/ML
INJECTION, SOLUTION INTRAVENOUS PRN
Status: DISCONTINUED | OUTPATIENT
Start: 2022-08-29 | End: 2022-08-29 | Stop reason: HOSPADM

## 2022-08-29 RX ORDER — ROCURONIUM BROMIDE 10 MG/ML
INJECTION, SOLUTION INTRAVENOUS PRN
Status: DISCONTINUED | OUTPATIENT
Start: 2022-08-29 | End: 2022-08-29 | Stop reason: SDUPTHER

## 2022-08-29 RX ORDER — SODIUM CHLORIDE 0.9 % (FLUSH) 0.9 %
5-40 SYRINGE (ML) INJECTION EVERY 12 HOURS SCHEDULED
Status: DISCONTINUED | OUTPATIENT
Start: 2022-08-29 | End: 2022-08-29 | Stop reason: HOSPADM

## 2022-08-29 RX ORDER — DROPERIDOL 2.5 MG/ML
0.62 INJECTION, SOLUTION INTRAMUSCULAR; INTRAVENOUS
Status: DISCONTINUED | OUTPATIENT
Start: 2022-08-29 | End: 2022-08-29 | Stop reason: HOSPADM

## 2022-08-29 RX ORDER — SODIUM CHLORIDE 0.9 % (FLUSH) 0.9 %
5-40 SYRINGE (ML) INJECTION PRN
Status: DISCONTINUED | OUTPATIENT
Start: 2022-08-29 | End: 2022-08-29 | Stop reason: HOSPADM

## 2022-08-29 RX ORDER — LABETALOL HYDROCHLORIDE 5 MG/ML
10 INJECTION, SOLUTION INTRAVENOUS
Status: DISCONTINUED | OUTPATIENT
Start: 2022-08-29 | End: 2022-08-29 | Stop reason: HOSPADM

## 2022-08-29 RX ORDER — ONDANSETRON 2 MG/ML
INJECTION INTRAMUSCULAR; INTRAVENOUS PRN
Status: DISCONTINUED | OUTPATIENT
Start: 2022-08-29 | End: 2022-08-29 | Stop reason: SDUPTHER

## 2022-08-29 RX ORDER — LIDOCAINE HYDROCHLORIDE 20 MG/ML
INJECTION, SOLUTION INTRAVENOUS PRN
Status: DISCONTINUED | OUTPATIENT
Start: 2022-08-29 | End: 2022-08-29 | Stop reason: SDUPTHER

## 2022-08-29 RX ORDER — LIDOCAINE HYDROCHLORIDE 10 MG/ML
1 INJECTION, SOLUTION EPIDURAL; INFILTRATION; INTRACAUDAL; PERINEURAL
Status: DISCONTINUED | OUTPATIENT
Start: 2022-08-29 | End: 2022-08-29 | Stop reason: HOSPADM

## 2022-08-29 RX ORDER — MAGNESIUM HYDROXIDE 1200 MG/15ML
LIQUID ORAL CONTINUOUS PRN
Status: COMPLETED | OUTPATIENT
Start: 2022-08-29 | End: 2022-08-29

## 2022-08-29 RX ORDER — PROCHLORPERAZINE EDISYLATE 5 MG/ML
5 INJECTION INTRAMUSCULAR; INTRAVENOUS
Status: DISCONTINUED | OUTPATIENT
Start: 2022-08-29 | End: 2022-08-29 | Stop reason: HOSPADM

## 2022-08-29 RX ORDER — ACETAMINOPHEN 325 MG/1
650 TABLET ORAL
Status: DISCONTINUED | OUTPATIENT
Start: 2022-08-29 | End: 2022-08-29 | Stop reason: HOSPADM

## 2022-08-29 RX ORDER — HYDRALAZINE HYDROCHLORIDE 20 MG/ML
10 INJECTION INTRAMUSCULAR; INTRAVENOUS
Status: DISCONTINUED | OUTPATIENT
Start: 2022-08-29 | End: 2022-08-29 | Stop reason: HOSPADM

## 2022-08-29 RX ORDER — MIDAZOLAM HYDROCHLORIDE 1 MG/ML
INJECTION INTRAMUSCULAR; INTRAVENOUS PRN
Status: DISCONTINUED | OUTPATIENT
Start: 2022-08-29 | End: 2022-08-29 | Stop reason: SDUPTHER

## 2022-08-29 RX ORDER — MEPERIDINE HYDROCHLORIDE 25 MG/ML
12.5 INJECTION INTRAMUSCULAR; INTRAVENOUS; SUBCUTANEOUS EVERY 5 MIN PRN
Status: DISCONTINUED | OUTPATIENT
Start: 2022-08-29 | End: 2022-08-29 | Stop reason: HOSPADM

## 2022-08-29 RX ORDER — PROPOFOL 10 MG/ML
INJECTION, EMULSION INTRAVENOUS PRN
Status: DISCONTINUED | OUTPATIENT
Start: 2022-08-29 | End: 2022-08-29 | Stop reason: SDUPTHER

## 2022-08-29 RX ORDER — OXYCODONE HYDROCHLORIDE 5 MG/1
10 TABLET ORAL PRN
Status: DISCONTINUED | OUTPATIENT
Start: 2022-08-29 | End: 2022-08-29 | Stop reason: HOSPADM

## 2022-08-29 RX ORDER — FENTANYL CITRATE 50 UG/ML
INJECTION, SOLUTION INTRAMUSCULAR; INTRAVENOUS PRN
Status: DISCONTINUED | OUTPATIENT
Start: 2022-08-29 | End: 2022-08-29 | Stop reason: SDUPTHER

## 2022-08-29 RX ORDER — OXYCODONE HYDROCHLORIDE 5 MG/1
5 TABLET ORAL PRN
Status: DISCONTINUED | OUTPATIENT
Start: 2022-08-29 | End: 2022-08-29 | Stop reason: HOSPADM

## 2022-08-29 RX ORDER — DEXAMETHASONE SODIUM PHOSPHATE 4 MG/ML
INJECTION, SOLUTION INTRA-ARTICULAR; INTRALESIONAL; INTRAMUSCULAR; INTRAVENOUS; SOFT TISSUE PRN
Status: DISCONTINUED | OUTPATIENT
Start: 2022-08-29 | End: 2022-08-29 | Stop reason: SDUPTHER

## 2022-08-29 RX ORDER — ROPIVACAINE HYDROCHLORIDE 5 MG/ML
INJECTION, SOLUTION EPIDURAL; INFILTRATION; PERINEURAL
Status: COMPLETED | OUTPATIENT
Start: 2022-08-29 | End: 2022-08-29

## 2022-08-29 RX ORDER — SODIUM CHLORIDE, SODIUM LACTATE, POTASSIUM CHLORIDE, CALCIUM CHLORIDE 600; 310; 30; 20 MG/100ML; MG/100ML; MG/100ML; MG/100ML
INJECTION, SOLUTION INTRAVENOUS CONTINUOUS
Status: DISCONTINUED | OUTPATIENT
Start: 2022-08-29 | End: 2022-08-29 | Stop reason: HOSPADM

## 2022-08-29 RX ORDER — HYDROCODONE BITARTRATE AND ACETAMINOPHEN 5; 325 MG/1; MG/1
1 TABLET ORAL EVERY 4 HOURS PRN
Qty: 21 TABLET | Refills: 0 | Status: SHIPPED | OUTPATIENT
Start: 2022-08-29 | End: 2022-09-05

## 2022-08-29 RX ORDER — MIDAZOLAM HYDROCHLORIDE 2 MG/2ML
2 INJECTION, SOLUTION INTRAMUSCULAR; INTRAVENOUS
Status: DISCONTINUED | OUTPATIENT
Start: 2022-08-29 | End: 2022-08-29 | Stop reason: HOSPADM

## 2022-08-29 RX ADMIN — ROCURONIUM BROMIDE 50 MG: 10 INJECTION, SOLUTION INTRAVENOUS at 07:42

## 2022-08-29 RX ADMIN — LIDOCAINE HYDROCHLORIDE 40 MG: 20 INJECTION, SOLUTION INTRAVENOUS at 07:41

## 2022-08-29 RX ADMIN — ROPIVACAINE HYDROCHLORIDE 20 ML: 5 INJECTION, SOLUTION EPIDURAL; INFILTRATION; PERINEURAL at 07:20

## 2022-08-29 RX ADMIN — MIDAZOLAM HYDROCHLORIDE 2 MG: 1 INJECTION, SOLUTION INTRAMUSCULAR; INTRAVENOUS at 07:14

## 2022-08-29 RX ADMIN — DEXAMETHASONE SODIUM PHOSPHATE 8 MG: 4 INJECTION, SOLUTION INTRAMUSCULAR; INTRAVENOUS at 07:56

## 2022-08-29 RX ADMIN — FENTANYL CITRATE 100 MCG: 50 INJECTION, SOLUTION INTRAMUSCULAR; INTRAVENOUS at 07:41

## 2022-08-29 RX ADMIN — PROPOFOL 200 MG: 10 INJECTION, EMULSION INTRAVENOUS at 07:41

## 2022-08-29 RX ADMIN — SUGAMMADEX 200 MG: 100 INJECTION, SOLUTION INTRAVENOUS at 08:40

## 2022-08-29 RX ADMIN — Medication 0.1 MG: at 08:08

## 2022-08-29 RX ADMIN — CEFAZOLIN 2000 MG: 10 INJECTION, POWDER, FOR SOLUTION INTRAVENOUS at 07:45

## 2022-08-29 RX ADMIN — SODIUM CHLORIDE, POTASSIUM CHLORIDE, SODIUM LACTATE AND CALCIUM CHLORIDE 1000 ML: 600; 310; 30; 20 INJECTION, SOLUTION INTRAVENOUS at 06:32

## 2022-08-29 RX ADMIN — ONDANSETRON 4 MG: 2 INJECTION INTRAMUSCULAR; INTRAVENOUS at 08:13

## 2022-08-29 ASSESSMENT — PAIN DESCRIPTION - DESCRIPTORS: DESCRIPTORS: SORE;ACHING

## 2022-08-29 ASSESSMENT — PAIN SCALES - GENERAL
PAINLEVEL_OUTOF10: 0
PAINLEVEL_OUTOF10: 0

## 2022-08-29 ASSESSMENT — PAIN - FUNCTIONAL ASSESSMENT: PAIN_FUNCTIONAL_ASSESSMENT: 0-10

## 2022-08-29 NOTE — OP NOTE
Operative Note      Patient: Nelly Vila  YOB: 1965  MRN: [id number]    Date of Procedure: 8/29/2022    Pre-Op Diagnosis: DISLOCATION LEFT ELBOW    Post-Op Diagnosis:  Complex left elbow dislocation stable status post removal of hinged internal fixator       Procedure:  Deep hardware removal left elbow  Debridement of bone left elbow  Manipulation under anesthesia left elbow    Surgeon(s):  Keiry Ann MD    Assistant:   Physician Assistant: Mckenzie Marroquin PA-C    Anesthesia: General    Estimated Blood Loss (mL): Minimal    Complications: None    Specimens:   * No specimens in log *    Implants:  * No implants in log *      Drains: * No LDAs found *    Findings: Stable left elbow status post removal of hinged internal fixator    Detailed Description of Procedure:   Patient was taken the operating room placed supine on the operative table. The left upper extremity was prepped and draped as typical for extremity procedure. A sterile tourniquet was applied to the left upper extremity in the axillary region. A timeout was performed, all parties identified, the correct surgical site was noted. At the conclusion of the timeout prior lateral based surgical site was utilized. The entire length of the surgical incision was utilized. Dissection was carried down through skin and subcutaneous tissue exposing the fascial layer. Gently elevating layer to the posterior aspect of the elbow the plate portion of the hinged internal fixator was identified. Utilizing this as a landmark and guide this was subsequently dissected over to the bar component with extension to the lateral humeral epicondyle. The plate was easily visualized and was subsequently removed as well as the traversing pin. At this juncture the bony sites were subsequently debrided sharply using a combination of scalpel, rongeur and curette down to and including the level of bone.   Wound was copiously irrigated with normal saline. Range of motion was then assessed with manipulation under anesthesia occurring obtaining full flexion and approximately 30 degrees from full extension. The fascial layer covering over the hinged portion of the device was closed with 2-0 Vicryl suture. Skin was closed with 2-0 Vicryl and 3-0 strata fix. A soft compressive dressing was applied. Patient tolerated procedure well. No complications occurred. An assistant was used during this procedure. Assistant Obey Gaytan PA-C was critical in the functions of positioning, prepping retracting and aiding in the closure and dressing application for this case.     Electronically signed by Qian Rdz MD on 8/29/2022 at 8:28 AM

## 2022-08-29 NOTE — H&P
History and physical reviewed from 8/22/2022 is correct without interval change. Plan to proceed with hardware removal left elbow with possible contracture release and possible manipulation under anesthesia.

## 2022-08-29 NOTE — DISCHARGE INSTRUCTIONS
[facility]    Orthopedic care:  Dr. Dangelo Ware PA-C    Procedure:   Hardware removal of elbow     Activity:  -keep sling on until your block from anesthesia wears off which should be this afternoon. Thereafter it is no longer required unless it helps with comfort. We encourage motion as tolerated of the surgical arm   -Please limit lifting pushing pulling with your surgical hand for the next 2 weeks     Dressing Care:  -Keep grey dressing on until we see you in office. It is waterproof so it is OK to shower with this on. If it becomes saturated, take off. In the event that that occurs, underneath is a mesh like glue dressing. Do not remove this at all costs. Medications:  -Motrin 400-800 mg every 4-6 hours first for pain. If not resolved, take norco every 6 hours. This was sent to your pharmacy - please limit the use of this medication as it is highly addictive.  -Continue taking other home medications as prescribed by your other doctors     Additional Instructions:  -Ice packs to surgical area on top of your dressing for 15-20 minutes every 4 hours if needed. Keep hand elevated/propped with pillows to help with swelling.      Dr. Dangelo Ware PA-C  [employer]  Surgery and Sports Medicine  [address] [facility], [address] [facility], [address]  [facility], [address] [place] [address]

## 2022-08-29 NOTE — ANESTHESIA PROCEDURE NOTES
Peripheral Block    Patient location during procedure: pre-op  Reason for block: post-op pain management and at surgeon's request  Start time: 8/29/2022 7:15 AM  End time: 8/29/2022 7:20 AM  Staffing  Performed: anesthesiologist   Anesthesiologist: Raudel Rose DO  Preanesthetic Checklist  Completed: patient identified, IV checked, site marked, risks and benefits discussed, surgical/procedural consents, equipment checked, pre-op evaluation, timeout performed, anesthesia consent given, oxygen available and monitors applied/VS acknowledged  Peripheral Block   Patient position: supine  Prep: ChloraPrep  Provider prep: mask and sterile gloves (Sterile probe cover)  Patient monitoring: cardiac monitor, continuous pulse ox, frequent blood pressure checks and IV access  Block type: Brachial plexus  Supraclavicular  Laterality: left  Injection technique: single-shot  Guidance: nerve stimulator and ultrasound guided  Local infiltration: ropivacaine  Infiltration strength: 0.5 %  Local infiltration: ropivacaine  Dose: 20 mL    Needle   Needle type: combined needle/nerve stimulator   Needle gauge: 22 G  Needle localization: anatomical landmarks and ultrasound guidance  Needle length: 5 cm  Assessment   Injection assessment: negative aspiration for heme, no paresthesia on injection and local visualized surrounding nerve on ultrasound  Paresthesia pain: immediately resolved  Slow fractionated injection: yes  Hemodynamics: stable  Real-time US image taken/store: yes  Outcomes: uncomplicated and patient tolerated procedure well    Additional Notes  Ultrasound image printed and saved in patient chart.     Sterile probe cover used    Medications Administered  ropivacaine (NAROPIN) injection 0.5% - Perineural, Neck   20 mL - 8/29/2022 7:20:00 AM

## 2022-08-29 NOTE — PROGRESS NOTES
Pt tot block area . Monitors applied with O2 @ 3L/min per NC. Timeout then sedation given. Dr Amy Lewis performed a supraclavicular block on the left. Pt tolerated procedure well.  RH

## 2022-08-29 NOTE — PROGRESS NOTES
Patient Kulwinder Diaz  [id number]  61 y.o.  1965  BOVIE PAD SITE CLEAR AND INTACT PRE AND POST OP. TAKEN TO PACU,   ATTACHED TO MONITOR AND REPORT GIVEN TO RN.   VSS DRSG DRY AND INTACT, SLING ON        Electronically signed by Fuad Castañeda RN on 8/29/2022

## 2022-08-29 NOTE — PROGRESS NOTES
Patient arrived to short stay phase 2 in high fowlers position ANOx3. No c/o pain or discomfort at this time. Left arm in arm sling with ice pack applied to elbow. Patient given water and Indira Doones to snack on. Will continue to monitor.

## 2022-08-29 NOTE — ANESTHESIA POSTPROCEDURE EVALUATION
Department of Anesthesiology  Postprocedure Note    Patient: Jorge Luis Leal  MRN: [id number]  YOB: 1965  Date of evaluation: 8/29/2022      Procedure Summary     Date: 08/29/22 Room / Location: [address]    Anesthesia Start: 0730 Anesthesia Stop: 8302    Procedure: REMOVAL HARDWARE LEFT ELBOW WITH DEBRIDEMENT OF BONE  AND MANIPULATION UNDER ANESTHESIA (Left: Elbow) Diagnosis:       Dislocation of left elbow, initial encounter      (DISLOCATION LEFT ELBOW)    Surgeons: Pooja Mahajan MD Responsible Provider: Odilia Rajan DO    Anesthesia Type: general, regional ASA Status: 2          Anesthesia Type: No value filed.     Oswaldo Phase I: Oswlado Score: 8    Oswaldo Phase II:        Anesthesia Post Evaluation    Patient location during evaluation: PACU  Patient participation: complete - patient participated  Level of consciousness: awake  Pain score: 0  Airway patency: patent  Nausea & Vomiting: no nausea  Complications: no  Cardiovascular status: blood pressure returned to baseline and hemodynamically stable  Respiratory status: acceptable, face mask and spontaneous ventilation  Hydration status: euvolemic  Multimodal analgesia pain management approach

## 2022-08-30 RX ORDER — LEVOTHYROXINE SODIUM 0.03 MG/1
25 TABLET ORAL DAILY
Qty: 30 TABLET | Refills: 1 | Status: SHIPPED | OUTPATIENT
Start: 2022-08-30 | End: 2022-09-23

## 2022-09-12 ENCOUNTER — HOSPITAL ENCOUNTER (OUTPATIENT)
Dept: ORTHOPEDIC SURGERY | Age: 57
Discharge: HOME | End: 2022-09-14
Payer: COMMERCIAL

## 2022-09-12 ENCOUNTER — OFFICE VISIT (OUTPATIENT)
Dept: ORTHOPEDIC SURGERY | Age: 57
End: 2022-09-12
Payer: COMMERCIAL

## 2022-09-12 VITALS
OXYGEN SATURATION: 98 % | BODY MASS INDEX: 35.16 KG/M2 | HEIGHT: 65 IN | HEART RATE: 67 BPM | TEMPERATURE: 98.2 F | WEIGHT: 211 LBS

## 2022-09-12 DIAGNOSIS — S42.402A CLOSED FRACTURE DISLOCATION OF LEFT ELBOW, INITIAL ENCOUNTER: Primary | ICD-10-CM

## 2022-09-12 DIAGNOSIS — S42.402A CLOSED FRACTURE DISLOCATION OF LEFT ELBOW, INITIAL ENCOUNTER: ICD-10-CM

## 2022-09-12 PROCEDURE — 73080 X-RAY EXAM OF ELBOW: CPT

## 2022-09-12 PROCEDURE — 99024 POSTOP FOLLOW-UP VISIT: CPT | Performed by: ORTHOPAEDIC SURGERY

## 2022-09-12 PROCEDURE — 73080 X-RAY EXAM OF ELBOW: CPT | Performed by: ORTHOPAEDIC SURGERY

## 2022-09-13 NOTE — PROGRESS NOTES
Subjective:      Patient ID: All Sheppard is a 61 y.o. female who presents today for:  Chief Complaint   Patient presents with    Post-Op Check     Post op: REMOVAL HARDWARE LEFT ELBOW WITH DEBRIDEMENT OF BONE  AND MANIPULATION UNDER ANESTHESIA. Pt states that she is feeling good and does not have any pain. Pt denies any numbness or tingling. X-rays done today. HPI  . Patient states that she feels great and feels that she is starting to feel like her elbow is normal again. No new traumas or falls. Past Medical History:   Diagnosis Date    Thyroid disease       Past Surgical History:   Procedure Laterality Date    ARM SURGERY Left 8/29/2022    REMOVAL HARDWARE LEFT ELBOW WITH DEBRIDEMENT OF BONE  AND MANIPULATION UNDER ANESTHESIA performed by Guerita Lynn MD at [address] Left     5/2022 fracture repair  in [facility] History     Socioeconomic History    Marital status:      Spouse name: Not on file    Number of children: Not on file    Years of education: Not on file    Highest education level: Not on file   Occupational History    Not on file   Tobacco Use    Smoking status: Never    Smokeless tobacco: Never   Vaping Use    Vaping Use: Never used   Substance and Sexual Activity    Alcohol use:  Yes     Alcohol/week: 0.0 standard drinks     Comment: 1 glass per week    Drug use: No    Sexual activity: Not on file   Other Topics Concern    Not on file   Social History Narrative    Not on file     Social Determinants of Health     Financial Resource Strain: Low Risk     Difficulty of Paying Living Expenses: Not hard at all   Food Insecurity: No Food Insecurity    Worried About Running Out of Food in the Last Year: Never true    Ran Out of Food in the Last Year: Never true   Transportation Needs: Not on file   Physical Activity: Not on file   Stress: Not on file   Social Connections: Not on file   Intimate Partner Violence: Not on file   Housing Stability: Not on file Family History   Problem Relation Age of Onset    Asthma Mother     Heart Attack Mother     Other Mother     Cancer Father         pancreas cancer    Other Father     No Known Problems Daughter      Allergies   Allergen Reactions    Penicillins Other (See Comments)     As baby -- patient has no recall     Current Outpatient Medications on File Prior to Visit   Medication Sig Dispense Refill    levothyroxine (SYNTHROID) 25 MCG tablet Take 1 tablet by mouth Daily 30 tablet 1    ALPRAZolam (XANAX XR) 0.5 MG extended release tablet TAKE 1 TABLET BY MOUTH EVERY 8 HOURS AS NEEDED FOR ANXIETY. DO NOT CRUSH, CHEW, SPLIT      acetaminophen (TYLENOL) 500 MG tablet Take 500 mg by mouth every 6 hours as needed for Pain       No current facility-administered medications on file prior to visit. Review of Systems      Objective:   Pulse 67   Temp 98.2 °F (36.8 °C) (Temporal)   Ht 5' 5\" (1.651 m)   Wt 211 lb (95.7 kg)   LMP 03/02/2016   SpO2 98%   BMI 35.11 kg/m²     Ortho Exam  Left upper extremity: Dressing was taken down and surgical incision is clean dry and intact with no evidence of dehiscence, erythema dried drainage or wound compromise. Patient lacks approximately 20 degrees of full extension and has flexion to approximately 140 degrees. Neurovascular intact distally with sensation tact light touch in the radial, ulnar and median nerve distribution. Strong palpable radial pulse. Radiographs and Laboratory Studies:     Diagnostic Imaging Studies:    AP, oblique and lateral view of the left elbow was obtained on 9/12/2022 to evaluate for maintenance of reduction of the ulnohumeral and radiocapitellar joint    Imaging demonstrates interval removal of hinged internal fixator of the left elbow with mild arthritic change appreciated with articulating and congruent radiocapitellar and ulnohumeral joint.     Laboratory Studies:   Lab Results   Component Value Date    WBC 3.7 (L) 08/22/2022    HGB 13.8 08/22/2022    HCT 40.9 08/22/2022    MCV 87.4 08/22/2022     08/22/2022     No results found for: SEDRATE  No results found for: CRP    Assessment:       Diagnosis Orders   1. Closed fracture dislocation of left elbow, initial encounter  XR ELBOW LEFT (MIN 3 VIEWS)    Ambulatory referral to Occupational Therapy             Plan:     Patient doing excellent overall. Discussed with patient physical therapy to focus on continued range of motion and strengthening. Patient feels that she does not need this at this time. Prescription was written in case patient changes her mind but would like see patient back in 2 months for repeat clinical examination of her left elbow. Patient was counseled about severity of this injury and subsequent likely scenario of posttraumatic arthritis and stiffness in the elbow. Okay for patient to progress weightbearing and range of motion and activity as tolerated at this time. Orders Placed This Encounter   Procedures    XR ELBOW LEFT (MIN 3 VIEWS)     Standing Status:   Future     Number of Occurrences:   1     Standing Expiration Date:   9/12/2023    Ambulatory referral to Occupational Therapy     Referral Priority:   Routine     Referral Type:   Eval and Treat     Referral Reason:   Specialty Services Required     Requested Specialty:   Occupational Therapy     Number of Visits Requested:   1     No orders of the defined types were placed in this encounter. Return for F/U WITH HARSH.       Néstor Rosas MD

## 2022-09-21 ENCOUNTER — HOSPITAL ENCOUNTER (OUTPATIENT)
Dept: OCCUPATIONAL THERAPY | Age: 57
Setting detail: THERAPIES SERIES
Discharge: HOME | End: 2022-09-21
Payer: COMMERCIAL

## 2022-09-21 PROCEDURE — 97166 OT EVAL MOD COMPLEX 45 MIN: CPT

## 2022-09-21 PROCEDURE — 97530 THERAPEUTIC ACTIVITIES: CPT

## 2022-09-21 NOTE — PROGRESS NOTES
[facility]:       [address], [address] [facility]  Ph: [phone]  Fax: [phone]    OCCUPATIONAL THERAPY EVALUATION     Evaluation Date:  9/21/2022       OT Individual Minutes  Time In: 8405  Time Out: 1500  Minutes: 52    OT Eval mod complexity 44 minutes for 1 unit, CPT 66748  OT Therapeutic activities 8 minutes for 1 unit(s), CPT 16266     Patient Loyce    Gender: female   YOB: 1965         MRN: [id number]     Physician: Referring Practitioner: Darryle Shropshire, PA-C  Diagnosis: Diagnosis: L elbow fx s/p hardware removal   Treating diagnosis: R29.898 Other symptoms and signs involving the musculoskeletal systems (decreased ROM and strength)                 Referral Date:  9/12/2022          Onset Date: Onset Date: 08/29/22    PMH:  Patient Active Problem List   Diagnosis    Closed fracture dislocation of left elbow joint     Past Medical History:   Diagnosis Date    Thyroid disease      Past Surgical History:   Procedure Laterality Date    ARM SURGERY Left 8/29/2022    REMOVAL HARDWARE LEFT ELBOW WITH DEBRIDEMENT OF BONE  AND MANIPULATION UNDER ANESTHESIA performed by Consuelo Murray MD at [address] [place] [address] Left     5/2022 fracture repair  in [place]     Allergies   Allergen Reactions    Penicillins Other (See Comments)     As baby -- patient has no recall       Diagnostic imaging: Physician interpretation of imaging tests reviewed. Medications:    Current Outpatient Medications:     levothyroxine (SYNTHROID) 25 MCG tablet, Take 1 tablet by mouth Daily, Disp: 30 tablet, Rfl: 1    ALPRAZolam (XANAX XR) 0.5 MG extended release tablet, TAKE 1 TABLET BY MOUTH EVERY 8 HOURS AS NEEDED FOR ANXIETY.  DO NOT CRUSH, CHEW, SPLIT, Disp: , Rfl:     acetaminophen (TYLENOL) 500 MG tablet, Take 500 mg by mouth every 6 hours as needed for Pain, Disp: , Rfl:     Visits Allowed/Insurance/Certification Information:   OT Visit Information  Onset Date: 08/29/22  OT Insurance Information: [employer]  Total # of Visits Approved: 20  Progress Note Due Date: 10/21/22    Restrictions/Precautions  no heavy lifting       English primary language:  yes    Transfer of pt care required: no,      SUBJECTIVE FINDINGS     Support contact: Emma Vega     Pt lives: spouse and children daughter   Home:  two level with 2 handrail(s) bilateral  going up   Entrance:  2 stairs into the house,   Bathroom: tub/shower combo   DME: None     Pain:                   Pain Location  Description Initial Rating  Current Rating  Improved by Worsened by   L elbow sharp pain NA 5/10 rest reaching into dryer   Max pain at home during activities: discomfort only in elbow  5/10  Lowest pain at home during activities/rest: 0/10    Action for pain:   Patient reports pain is at acceptable level for treatment. Prior Level of Function:    Patient was performing at independent level for ADL and IADL activities prior to incident/injury/condition. Work Status:  Pt is retired,    Is this a work related injury: no   Is this a [address] [place] [address] claim: no      Driving:yes      Hobbies, Leisure, social activities: swimming, water arobics     Previous OT treatments for this condition: no. Pt. had PT treatment prior to hardware removal.    History of Present Illness or Pain/ Chief complaint:  Pt. fell and hit ceramic tile when she tripped over a rug.     Current Functional Limitations Per Patient Report:   Orientation: Oriented x 3  Communication: No concerns   Hearing: No concerns   Perception: No concerns    Vision:  WFL              Feeding: IND  Grooming: IND with increased time   Bathing: IND  UE dressing: IND with increased time   LE dressing: IND with increased time   Toileting: No concerns   Toilet transfer: No concerns   Tub/Shower transfer: No concerns     Cooking:  indep  Cleaning: IND  Laundry:  difficulty reaching into the dryer to remove clothes     Sleep: No concerns     Medication management: 3 tries Right Norm Left Norm    (lb) 52 Female age 49-57: 46 lbs  22 Female age 49-57: 55 lbs    Guerrero Pinch (lb) 15 Female age 49-57: 11.0 lbs  6 Female age 32-38: 12.5 lbs    Lateral Pinch (lb) 13 Female age 49-57: 12.0 lbs  6 Female age 49-57: 11.0 lbs    Comments:    Coordination & Dexterity   Right Norm Left Norm   Nine Hole Peg Test  (seconds) 23 Female age 49-57: 14.7 s  20.0 Female age 49-57: 23.2 s           Comments:     Skin Integrity  12 cm well healed scar lateral elbow joint w/ mod soft tissue restrictions distal aspect of scar. Cognition:    No issues noted on evaluation. Sensation:     WFL    Tone:   normal tone      Joint Mobility  Joint stiffness L elbow joint and proximal forearm    Palpation/Tenderness  Pt. reports tenderness posterior elbow(olecranon) and mild sensitivity over scar site. Education/Barriers to learning:     Barriers:none   Education on this date: OT role and POC , scar massage and UE stretch to relieve soft tissue tightness upper arm. Written handout provided. ASSESSMENT    Pt is a 61 y.o. female with limited L shoulder and elbow ROM, strength and mobility due to recent fall, fractured elbow. Problems:  [x] Decreased UE strength   [x] Decreased UE ROM   [x] Decreased  strength   [] Decreased fine motor skills   [] Increased pain    [x] Decreased ADL status   [x] Decreased joint mobility   [x] Decreased coordination and speed LUE  [] Decreased sensation    [] Other:      Complexity:         [] Low Complexity:   History: Brief history including review of medical records relating to the problem  Exam: 1-3 performance Deficits  Assistance/Modification: No assistance or modifications required to perform tasks.  No comorbities affecting occupational performance  [x] Medium Complexity:   History: Expanded review of medical records and additional review of physical, cognitive, or psychosocial history related to current functional performance  Exam: 3-5 performance deficits  Assistance/Modification: Min/mod assistance or modifications required to perform tasks. May have comorbidities that affect occupational performance. [] High Complexity:   History: Extensive review of medical records and additional review of physical, cognitive, or psychosocial history related to current functional performance. Exam: 5 or more performance deficits  Assistance/Modification: Significant assistance or modifications required to perform tasks. Have comorbidities that affect occupational performance. Outcome Measure(s) Completed:   Upper Extremity Functional Index   Today, do you or would you have any difficulty at all with: Any of your usual work, housework, or school activities[de-identified] A little bit of difficulty  Your usual hobbies, re creational or sporting activities[de-identified] A little bit of difficulty  Lifting a bag of groceries to waist level[de-identified] A little bit of difficulty  Lifting a bag of groceries above your head[de-identified] No difficulty  Grooming your hair[de-identified] No difficulty  Pushing up on your hands (eg from bathtub or chair):: No difficulty  Preparing food (eg peeling, cutting):: No difficulty  Driving[de-identified] No difficulty  Vacuuming, sweeping or raking[de-identified] No difficulty  Dressing[de-identified] No difficulty  Doing up buttons[de-identified] No difficulty  Using tools or appliances[de-identified] No difficulty  Opening doors[de-identified] No difficulty  Cleaning[de-identified] No difficulty  Tying or lacing shoes[de-identified] No difficulty  Sleeping[de-identified] No difficulty  Laundering clothes (eg washing, ironing, folding):: A little bit of difficulty  Opening a jar[de-identified] No difficulty  Throwing a ball[de-identified] No difficulty  Carrying a small suitcase with your affected limb[de-identified] A little bit of difficulty  UEFI Total Score: 75  UEFI Total Percentage: 93.75 %     Total Score (out of 80) - if applicable: 75   Current Percentage of Function: 93.75 % % (Date: 9/21/2022)    Interpretation of Score: The final UEFI score ranges between 0 and 80 points.  Scores closer to 0 indicate severe limitation performance with I/ADL's. LTG 7 :  Pt. will decrease soft tissue restrictions in shoulder, upper arm and elbow to be able to reach behind her back to waist level. POORNIMA Best 9/21/2022 3:18 PM    Falls Risk Assessment     Age: 0-59 = 0          60-69= 1            > 70= 2 History of Falls:   0  Falls  last 6 mo = 0    1  fall  Last  6 mo = 1   1-3 falls last 6 mo = 2 Medical History:   Parkinsons, CVA,HTN, vertigo, >4 meds, use of assistive device (1pt.for each)  Mental Status:  A & O x 3 = 0  Disoriented to person, place, or time = 2     [x]  INITIAL ASSESSMENT:                                                      Date: 9/21/2022                                                  Age:   1                                                         Falls: 1                                                          PMH: 0                                                          Mental: 0                                                       Total:  1                                                        *Patient 4 or younger:   Vestibular:     Signature: POORNIMA Best                                                      High Risk for Falls: >8  Intermediate Risk for Falls: 4-8   Low Risk for Falls: <4    * All pediatric patients (age 3 or younger) and vestibular patients will be considered high risk for falls- scoring does not need to be completed - treat as high risk. Interventions     Low Risk: Monitor for changes, reassess every three months. Intermediate Risk: Supervision for most activities, direct contact with new activities or equipment, reassess monthly. High Risk: Stand by assitance at all times, reassess monthly. The following patient has been evaluated for occupational therapy services. For therapy to continue, insurance requires physician review of the treatment plan.  Please review the attached evaluation and/or summary of the patient's plan of care, and verify that you agree therapy should continue by signing the attached document and sending it back to our office.  Thank you for this referral.    Physician signature____________________________________     Date________________

## 2022-09-22 DIAGNOSIS — E03.9 HYPOTHYROIDISM, UNSPECIFIED TYPE: ICD-10-CM

## 2022-09-22 NOTE — TELEPHONE ENCOUNTER
Patient is requesting medication refill.  Please approve or deny this request.    Rx requested:  Requested Prescriptions     Pending Prescriptions Disp Refills    levothyroxine (SYNTHROID) 25 MCG tablet [Pharmacy Med Name: LEVOTHYROXINE 25 MCG TABLET] 30 tablet 1     Sig: TAKE 1 TABLET BY MOUTH EVERY DAY         Last Office Visit:   7/5/2022      Next Visit Date:  Future Appointments   Date Time Provider Phill Sanders   9/26/2022  3:00 PM [facility]   9/28/2022  2:00 PM PRN [place], SCHEDULE MLOZ OP OT [facility] [place]   10/3/2022  2:00 PM PRN [place], SCHEDULE MLOZ OP [address] [place] [address],1   10/5/2022  2:00 PM PRN [place], SCHEDULE MLOZ OP [address] [place] [address],[facility]   10/10/2022  2:00 PM Mindy Hedger, OTR/L [facility]   10/12/2022  2:00 PM Mind[facility] Hedger, OTR[facility]L [facility]   10/17/2022  2:00 PM Mind[facility] Hedger, OTR[facility]L [facility]   10/19/2022  2:00 PM Mindy Hedger, OTR[facility]L [facility]   10/24/2022  2:00 PM Imndy Hedger, OTR/L [address] [place]   10/26/2022  2:00 PM Mindy Hedger, OTR[facility]L [facility]   10/31/2022  2:00 PM Mindy Hedger, OTR/L [address] [place]   11/2/2022  2:00 PM Mindy Hedger, OTR/L [address] [facility]

## 2022-09-23 RX ORDER — LEVOTHYROXINE SODIUM 0.03 MG/1
TABLET ORAL
Qty: 30 TABLET | Refills: 1 | Status: SHIPPED | OUTPATIENT
Start: 2022-09-23 | End: 2022-10-20 | Stop reason: SDUPTHER

## 2022-09-26 ENCOUNTER — HOSPITAL ENCOUNTER (OUTPATIENT)
Dept: OCCUPATIONAL THERAPY | Age: 57
Setting detail: THERAPIES SERIES
Discharge: HOME | End: 2022-09-26
Payer: COMMERCIAL

## 2022-09-26 PROCEDURE — 97530 THERAPEUTIC ACTIVITIES: CPT

## 2022-09-26 PROCEDURE — 97140 MANUAL THERAPY 1/> REGIONS: CPT

## 2022-09-26 NOTE — PROGRESS NOTES
[facility]     Occupational Therapy: Daily Note   Patient: Jitendra Call (38 y.o. female)   Date:   Plan of Care Certification Period:  22   :  1965  MRN: [id number]  CSN: [id number]   Insurance: Payor: Sean Bueno / Plan: [employer] [place] ARABELLA / Product Type: *No Product type* /   Insurance ID: [id number] - (Commercial) Secondary Insurance (if applicable): Gilford Sievert   Referring Physician: Dylon Rashid MD     PCP: Michael Finney MD Visits to Date: Total # of Visits to Date: 2   Progress note:Progress Note Counter: 2  Visits Approved: 20    No Show:    Cancelled Appts:      Medical Diagnosis: Unspecified fracture of lower end of left humerus, initial encounter for closed fracture [S42.402A] L elbow fx s/p hardware removal        Therapy Time    Time in 1502   Time out 1600   Total treatment minutes 58   Total time code minutes  58 Minutes        OT Manual therapy 15 minutes for 1 unit(s), CPT 26920  OT Therapeutic activities 43 minutes for 3 unit(s), CPT 22111       SUBJECTIVE EXAMINATION     Patient's date of birth confirmed: Yes     Pain Level:   Pt denies pain    Patient Comments:   \"I feel I am doing well. \"    Learning/Language barrier: no     HEP/Strategies/Orthosis Compliance: Patient verbally confirmed compliant with HEP's      Restrictions: No heavy lifting LUE. OBJECTIVE EXAMINATION       TREATMENT     Focus of treatment was on the following:   increasing AROM L elbow and decr sensitivity scar      MFR/Manual:   Pt treated seated on chair with LUE supported on table, soft tissue mobilization of lateral upper arm performed to alleviate muscle/fascial restrictions. Pt. reports moderate discomfort in middle deltoid and distal biceps where muscle restrictions are palpated. Soft tissue mobilization and retrograde massage performed entire L forearm. Cupping and cross friction massage performed to elbow scar.   Cupping only tolerated distal 1/3rd of scar due to reports of pain. Fluidotherapy at 115° F for 20 minutes while completing AROM LUE forearm, wrist and hand while desensitizing L Elbow scar. Exercises/Activities LUE:  AAROM LUE with distal UE supported on ball on table in fully extended position, performing elbow flex/ext with IR/ER x 10 reps. Overhead reaching across midline transferring discs across shoulder arch x 12 discs to encourage elbow ext. Placing 1, 2 & 4 lb. graded resistance clips on vertical pole x 21 clips  Instructed pt. in and pt. performed towel stretches on table for shoulder and elbow AAROM HEP x 5 reps. Straight arm raises with hand pumping to decrease edema x 3 reps. Patient Education/HEP:    Towel on table stretches and edema management HEP. ASSESSMENT       Assessment: Pt tolerated treatment well. Pt. tearful during initial soft tissue mob of upper arm due to pain and soreness. Pt. reports,\"I thought I was doing well and didn't need therapy and my arm isn't back to normal like I thought. \"  Pt. able to extend elbow through improved AROM at end of session. Pt. demo good understanding of HEP. Post Treatment Pain: Pt denies pain    Patient's Activity Tolerance: Fairly well                 GOALS         Long Term Goals  Time Frame for Long term goals : 6 weeks/12 visits  Long Term Goal 1: Patient will report pain 2 or less during functional activities  Long Term Goal 2: Patient  will be independent with all recommended HEP's, adaptive strategies, and adaptive techniques. Long Term Goal 3: Patient will increase AROM of L elbow from current by 10-20° to increase performance with I/ADL's  Long Term Goal 4: Patient  will increase RUE strength from current by 1/2 muscle grade  to increase performance with I/ADL's. Long Term Goal 5: Patient  will increase R  strength from current by 15-20 lbs to increase performance with I/ADL's.   Long Term Goal 6: Patient  will increase dexterity in L hand as observed by 9 hole peg test by decreasing time from current  by 2 seconds to increase performance with I/ADL's. Long Term Goal 7: Pt. will decrease soft tissue restrictions in shoulder, upper arm and elbow to be able to reach behind her back to waist level. TREATMENT PLAN   Continue POC 2 x week for AAROM/PROM and improving mobility of LUE.            Electronically signed by POORNIMA Roa  on 9/26/2022 at 4:37 PM

## 2022-09-28 ENCOUNTER — HOSPITAL ENCOUNTER (OUTPATIENT)
Dept: OCCUPATIONAL THERAPY | Age: 57
Setting detail: THERAPIES SERIES
Discharge: HOME | End: 2022-09-28
Payer: COMMERCIAL

## 2022-09-28 PROCEDURE — 97530 THERAPEUTIC ACTIVITIES: CPT

## 2022-09-28 PROCEDURE — 97124 MASSAGE THERAPY: CPT

## 2022-09-28 PROCEDURE — 97140 MANUAL THERAPY 1/> REGIONS: CPT

## 2022-09-28 NOTE — PROGRESS NOTES
[facility] OCCUPATIONAL THERAPY     Occupational Therapy: Daily Note   Patient: Alexei Boles (35 y.o. female)   Date: 3588  Plan of Care Certification Period:      :  1965  MRN: [id number]  CSN: [id number]   Insurance: Payor: Simón Clay / Plan: [employer] ARABELLA / Product Type: *No Product type* /   Insurance ID: [id number] - (Commercial) Secondary Insurance (if applicable): Devonda Asp   Referring Physician: Tony Gonsalez MD     PCP: Ronen Mcqueen MD Visits to Date: Total # of Visits to Date: 3   Progress note:Progress Note Counter: 3  Visits Approved: 20    No Show:    Cancelled Appts:      Medical Diagnosis: Unspecified fracture of lower end of left humerus, initial encounter for closed fracture [S42.402A] L elbow fx s/p hardware removal        Therapy Time    Time in 1400   Time out 1458   Total treatment minutes 58   Total time code minutes           OT Manual therapy 12 minutes for 1 unit(s), CPT 79457  OT Massage 10 minutes for 1 unit(s), CPT 69349  OT Therapeutic activities 36 minutes for 2 unit(s), CPT 14334       SUBJECTIVE EXAMINATION     Patient's date of birth confirmed: Yes     Pain Level:   Pt denies pain    Patient Comments:   \"I need this. '(Elbow flexion)         Learning/Language barrier: no       HEP/Strategies/Orthosis Compliance: Patient verbally confirmed compliant with HEP's          Restrictions: N/A           OBJECTIVE EXAMINATION         TREATMENT     Focus of treatment was on the following:   decreasing fascial/soft tissue restrictions, decreasing pain, and increase left  elbow active ROM     MFR/Manual:   Upper extremity: left  arm pull  and soft tissue mobility (identify area): elbow and biceps         MFR    Therapeutic Activities:  (CPT 79794) Treatment Reasoning     Pt completed towel rolling and ball rolling on table top with left UE 2 x 10 sets. Pt completed round about internal/external rotation act with nerf ball x 10 trials.   Pt completed wall exercise with wash cloth connecting numbers x 10 trials  Decreased ROM of left UE                  Manual Therapy: (CPT 18001) Treatment Reasoning      Cross friction scar massage to left elbow scar  Increased left UE scarring                    Patient Education/HEP:   Continue recommended HEP/activities. ASSESSMENT       Assessment: Pt tolerated treatment well. Pt with pain noted at end ROM for flexion and extension of elbow which subsided with movement. Pt reports tenderness at distal end of elbow scar on left       Post Treatment Pain: 0/10    Patient's Activity Tolerance: well                 GOALS         GOALS   Long Term Goals  Time Frame for Long term goals : 6 weeks/12 visits  Long Term Goal 1: Patient will report pain 2 or less during functional activities  Long Term Goal 2: Patient  will be independent with all recommended HEP's, adaptive strategies, and adaptive techniques. Long Term Goal 3: Patient will increase AROM of L elbow from current by 10-20° to increase performance with I/ADL's  Long Term Goal 4: Patient  will increase RUE strength from current by 1/2 muscle grade  to increase performance with I/ADL's. Long Term Goal 5: Patient  will increase R  strength from current by 15-20 lbs to increase performance with I/ADL's. Long Term Goal 6: Patient  will increase dexterity in L hand as observed by 9 hole peg test by decreasing time from current  by 2 seconds to increase performance with I/ADL's. Long Term Goal 7: Pt. will decrease soft tissue restrictions in shoulder, upper arm and elbow to be able to reach behind her back to waist level.               TREATMENT PLAN   Continue POC           Electronically signed by POORNIMA Bray  on 5/06/3047 at 3:05 PM

## 2022-10-03 ENCOUNTER — HOSPITAL ENCOUNTER (OUTPATIENT)
Dept: OCCUPATIONAL THERAPY | Age: 57
Setting detail: THERAPIES SERIES
Discharge: HOME | End: 2022-10-03
Payer: COMMERCIAL

## 2022-10-03 PROCEDURE — 97530 THERAPEUTIC ACTIVITIES: CPT

## 2022-10-03 NOTE — PROGRESS NOTES
[facility] OCCUPATIONAL THERAPY     Occupational Therapy: Daily Note   Patient: Dolores Butcher (84 y.o. female)   Date: 10/12/5810  Plan of Care Certification Period:      :  1965  MRN: [id number]  CSN: [id number]   Insurance: Payor: Karine Calleser / Plan: Margret Griffin ARABELLA / Product Type: *No Product type* /   Insurance ID: [id number] - (Commercial) Secondary Insurance (if applicable): Jaron Obando   Referring Physician: Sergey York MD     PCP: Ranjeet Mendoza MD Visits to Date:     Progress note:   Visits Approved:      No Show:    Cancelled Appts:      Medical Diagnosis: Unspecified fracture of lower end of left humerus, initial encounter for closed fracture [S42.402A]          Therapy Time    Time in 1355   Time out 1456   Total treatment minutes 61   Total time code minutes           OT Therapeutic activities 61 minutes for 4 unit(s), CPT 30916       SUBJECTIVE EXAMINATION     Patient's date of birth confirmed: Yes     Pain Level:   Pt denies pain    Patient Comments: \"How mush more do you think that I need?'  \"I'm doing better. \"  \"I do everything with this arm. \"         Learning/Language barrier: no       HEP/Strategies/Orthosis Compliance: Patient verbally confirmed compliant with HEP's          Restrictions:  N/A           OBJECTIVE EXAMINATION         TREATMENT     Focus of treatment was on the following:   decreasing fascial/soft tissue restrictions, fine motor/dexterity, and increase left  elbow active ROM     Therapeutic Activities:  (CPT 58189) Treatment Reasoning     Pt completed multiple acts including, bilateral pulleys x 3 mins, UBE 2 sets x 2 mins, valpar panels #1& #3, towel exercises in table top for towel rolling. Pt completed wall exercises with towel for all ue planes. Pt completed standing towel exercises for elbow flexion/extension. Pt completed reverse pendulum with weighted hammer 2 sets x 10 reps. Pt completed wrist roller x 2 mins.   Decreased left UE function and strength. Patient Education/HEP:   Continue recommended HEP/activities. ASSESSMENT       Assessment: Pt tolerated treatment well. Post Treatment Pain: Pt denies pain    Patient's Activity Tolerance: Pt tolerated treatment well with fatigue noted with UE                 GOALS         GOALS   Long Term Goals  Time Frame for Long term goals : 6 weeks/12 visits  Long Term Goal 1: Patient will report pain 2 or less during functional activities  Long Term Goal 2: Patient  will be independent with all recommended HEP's, adaptive strategies, and adaptive techniques. Long Term Goal 3: Patient will increase AROM of L elbow from current by 10-20° to increase performance with I/ADL's  Long Term Goal 4: Patient  will increase RUE strength from current by 1/2 muscle grade  to increase performance with I/ADL's. Long Term Goal 5: Patient  will increase R  strength from current by 15-20 lbs to increase performance with I/ADL's. Long Term Goal 6: Patient  will increase dexterity in L hand as observed by 9 hole peg test by decreasing time from current  by 2 seconds to increase performance with I/ADL's.   Long Term Goal 7: Pt. will decrease soft tissue restrictions in shoulder, upper arm and elbow to be able to reach behind her back to waist level              TREATMENT PLAN   Continue POC           Electronically signed by POORNIMA Riddle  on 42/1/2379 at 3:33 PM

## 2022-10-05 ENCOUNTER — HOSPITAL ENCOUNTER (OUTPATIENT)
Dept: OCCUPATIONAL THERAPY | Age: 57
Setting detail: THERAPIES SERIES
Discharge: HOME | End: 2022-10-05
Payer: COMMERCIAL

## 2022-10-05 PROCEDURE — 97530 THERAPEUTIC ACTIVITIES: CPT

## 2022-10-05 NOTE — PROGRESS NOTES
[facility] OCCUPATIONAL THERAPY     Occupational Therapy: Daily Note   Patient: Yadira Shepard (84 y.o. female)   Date:   Plan of Care Certification Period:      :  1965  MRN: [id number]  CSN: [id number]   Insurance: Payor: [address] [place] [address] / Plan: Anne Cranker ARABELLA / Product Type: *No Product type* /   Insurance ID: [id number] - (Commercial) Secondary Insurance (if applicable): Alisson Amor   Referring Physician: Ashli Davis MD     PCP: Jovi Piña MD Visits to Date: Total # of Visits to Date: 5   Progress note:Progress Note Counter: 5  Visits Approved: 20    No Show:    Cancelled Appts:      Medical Diagnosis: Unspecified fracture of lower end of left humerus, initial encounter for closed fracture [S42.402A] L elbow fx s/p hardware removal        Therapy Time    Time in 1400   Time out 1500   Total treatment minutes 60   Total time code minutes           OT Therapeutic activities 60 minutes for 4 unit(s), CPT 80959       SUBJECTIVE EXAMINATION     Patient's date of birth confirmed: Yes     Pain Level:   Pt denies pain    Patient Comments:   \"I think it's moving more. \"         Learning/Language barrier: no       HEP/Strategies/Orthosis Compliance: Patient verbally confirmed compliant with HEP's          Restrictions: N/A           OBJECTIVE EXAMINATION         TREATMENT     Focus of treatment was on the following:   decreasing fascial/soft tissue restrictions, increase left  elbow active ROM, and strengthening left  UE          Therapeutic Activities:  (CPT 49800) Treatment Reasoning     Pt completed acts in seated and standing to increase left UE function. Acts included pulleys x 3 mind, biceps curls with 10# on pulleys. Pt completed restorator x 3 mins x 2 sets. Pt completed rowing and chest press with red theraband 2 x 10 reps each actvity. Pt completed wall exercises with washcloth x 10 trials. Pt completed towel rolling for flexion/extension seated at table.   Pt completed reverse pendulum ex with weighted hammer 2 x 10 reps. Pt completed Valpar panels #1 & #3 in standing. Decreased ROM, function and strength of left UE                    Patient Education/HEP:   Continue recommended HEP/activities. ASSESSMENT       Assessment: Pt tolerated treatment well. Post Treatment Pain: Pt denies pain    Patient's Activity Tolerance: Pt with mild limitation to extension of left elbow, greater deficits to flexion of left elbow                 GOALS       Long Term Goals  Time Frame for Long term goals : 6 weeks/12 visits  Long Term Goal 1: Patient will report pain 2 or less during functional activities  Long Term Goal 2: Patient  will be independent with all recommended HEP's, adaptive strategies, and adaptive techniques. Long Term Goal 3: Patient will increase AROM of L elbow from current by 10-20° to increase performance with I/ADL's  Long Term Goal 4: Patient  will increase RUE strength from current by 1/2 muscle grade  to increase performance with I/ADL's. Long Term Goal 5: Patient  will increase R  strength from current by 15-20 lbs to increase performance with I/ADL's. Long Term Goal 6: Patient  will increase dexterity in L hand as observed by 9 hole peg test by decreasing time from current  by 2 seconds to increase performance with I/ADL's.   Long Term Goal 7: Pt. will decrease soft tissue restrictions in shoulder, upper arm and elbow to be able to reach behind her back to waist level            TREATMENT PLAN   Continue POC           Electronically signed by POORNIMA Simpson  on 25/8/9847 at 3:06 PM

## 2022-10-10 ENCOUNTER — HOSPITAL ENCOUNTER (OUTPATIENT)
Dept: OCCUPATIONAL THERAPY | Age: 57
Setting detail: THERAPIES SERIES
Discharge: HOME | End: 2022-10-10
Payer: COMMERCIAL

## 2022-10-10 NOTE — PROGRESS NOTES
Therapy                            Cancellation/No-show Note    Date: 10/10/2022  Patient Name: Maximino Monsalve    : 1965  (12 y.o.)     MRN: [id number]    Account #: [de-identified]       Canceled Appointment: 1    Comments: For today's appointment patient:  [x]  Cancelled  []  Rescheduled appointment  []  No-show   []  Called pt to remind of next appointment     Reason given by patient:  [x]  Patient ill  []  Conflicting appointment  []  No transportation    []  Conflict with work  []  No reason given  []  Other:      [x] Pt has future appointments scheduled, no follow up needed  [] Pt requests to be on hold. Reason:   If > 2 weeks please discuss with therapist.  [] Therapist to call pt for follow up     Signature:  POORNIMA Kimbrough 10/10/2022 2:04 PM

## 2022-10-12 ENCOUNTER — HOSPITAL ENCOUNTER (OUTPATIENT)
Dept: OCCUPATIONAL THERAPY | Age: 57
Setting detail: THERAPIES SERIES
Discharge: HOME | End: 2022-10-12
Payer: COMMERCIAL

## 2022-10-12 NOTE — PROGRESS NOTES
Therapy                            Cancellation/No-show Note    Date: 10/12/2022  Patient Name: Dolores Butcher    : 1965  (88 y.o.)     MRN: [id number]    Account #: [de-identified]       Canceled Appointment: 2    Comments: For today's appointment patient:  [x]  Cancelled  []  Rescheduled appointment  []  No-show   []  Called pt to remind of next appointment     Reason given by patient:  [x]  Patient ill  []  Conflicting appointment  []  No transportation    []  Conflict with work  []  No reason given  []  Other:      [x] Pt has future appointments scheduled, no follow up needed  [] Pt requests to be on hold. Reason:   If > 2 weeks please discuss with therapist.  [] Therapist to call pt for follow up     Signature:  POORNIMA Boggs 10/12/2022 2:43 PM

## 2022-10-17 ENCOUNTER — HOSPITAL ENCOUNTER (OUTPATIENT)
Dept: OCCUPATIONAL THERAPY | Age: 57
Setting detail: THERAPIES SERIES
Discharge: HOME | End: 2022-10-17
Payer: COMMERCIAL

## 2022-10-17 PROCEDURE — 97530 THERAPEUTIC ACTIVITIES: CPT

## 2022-10-17 PROCEDURE — 97110 THERAPEUTIC EXERCISES: CPT

## 2022-10-17 NOTE — PROGRESS NOTES
[facility] OCCUPATIONAL THERAPY     Occupational Therapy: Daily Note   Patient: Adela Perez (47 y.o. female)   Date:   Plan of Care Certification Period:  22   :  1965  MRN: [id number]  CSN: [id number]   Insurance: Payor: Kaitlin Navarro / Plan: August Rivers ARABELLA / Product Type: *No Product type* /   Insurance ID: [id number] - (Commercial) Secondary Insurance (if applicable): Connie Landin   Referring Physician: Khanh Pineda MD     PCP: Patria Singer MD Visits to Date: Total # of Visits to Date: 6   Progress note:Progress Note Counter: 6  Visits Approved: 20    No Show:    Cancelled Appts:2     Medical Diagnosis: Unspecified fracture of lower end of left humerus, initial encounter for closed fracture [S42.402A] L elbow fx s/p hardware removal        Therapy Time    Time in 1400   Time out 1459   Total treatment minutes 59   Total time code minutes  61 Minutes        OT Therapeutic activities 35 minutes for 2 unit(s), CPT 13051  OT Therapeutic exercises 24 minutes for 2 unit(s), CPT 34394       SUBJECTIVE EXAMINATION     Patient's date of birth confirmed: Yes     Pain Level:   Pt denies pain    Patient Comments:   \"I think I am doing really well and won't need much more therapy. But I want to be able to touch my shoulder with my L hand. \"    Learning/Language barrier: n/a     HEP/Strategies/Orthosis Compliance: Patient verbally confirmed compliant with HEP's      Restrictions: none      OBJECTIVE EXAMINATION     TREATMENT     Focus of treatment was on the following:   improving AROM L shoulder and elbow      MFR/Manual:   Pt treated supine on mat table for PROM and soft tissue mobilization of L shoulder and scapula. Stretch to infrapinatus and sub scapularis muscle tolerated with good results. Exercises/Activities:  Instructed pt. in and pt. performed stretching HEP for shoulder, scapula and elbow in supine and standing. 6 exercise x 10 reps each.   Pt. performed wall slides with LUE for overhead reach and ER x 10 reps each  Using 2 lb. eben, pt. was able to touch clock numbers at various positions on wall x 3 reps only  Active elbow flexion/extension picking up bean bags off of table, raising arm to full shoulder flexion, then flexing elbow to top of shirt collar to drop behind back; 14 reps. Fluidotherapy at 115° F for 20 minutes while completing AROM of L elbow and forearm. Patient Education/HEP:    stretching HEP, written instructions provided with pictures. ASSESSMENT       Assessment: Pt tolerated treatment well. Pt making good  progress towards goals. Pt. reports shoulder stiffness and discomfort in various positions, however demonstrates good understanding and follow through with HEP. Pt. reports significant fatigue LUE. Post Treatment Pain: Pt denies pain    Patient's Activity Tolerance: good                 GOALS         Long Term Goals  Time Frame for Long Term Goals : 6 weeks/12 visits  Long Term Goal 1: Patient will report pain 2 or less during functional activities  Long Term Goal 2: Patient  will be independent with all recommended HEP's, adaptive strategies, and adaptive techniques. Long Term Goal 3: Patient will increase AROM of L elbow from current by 10-20° to increase performance with I/ADL's  Long Term Goal 4: Patient  will increase RUE strength from current by 1/2 muscle grade  to increase performance with I/ADL's. Long Term Goal 5: Patient  will increase R  strength from current by 15-20 lbs to increase performance with I/ADL's. Long Term Goal 6: Patient  will increase dexterity in L hand as observed by 9 hole peg test by decreasing time from current  by 2 seconds to increase performance with I/ADL's. Long Term Goal 7: Pt. will decrease soft tissue restrictions in shoulder, upper arm and elbow to be able to reach behind her back to waist level.          TREATMENT PLAN   Improve dynamics of shoulder and elbow for active reaching during ADLs.            Electronically signed by POORNIMA Levi  on 10/17/2022 at 4:16 PM

## 2022-10-18 DIAGNOSIS — E03.9 HYPOTHYROIDISM, UNSPECIFIED TYPE: ICD-10-CM

## 2022-10-19 ENCOUNTER — HOSPITAL ENCOUNTER (OUTPATIENT)
Dept: OCCUPATIONAL THERAPY | Age: 57
Setting detail: THERAPIES SERIES
Discharge: HOME | End: 2022-10-19
Payer: COMMERCIAL

## 2022-10-19 PROCEDURE — 97530 THERAPEUTIC ACTIVITIES: CPT

## 2022-10-19 PROCEDURE — 97140 MANUAL THERAPY 1/> REGIONS: CPT

## 2022-10-19 NOTE — PROGRESS NOTES
[facility] OCCUP[facility] THERAP[facility]     Occupational Therapy: Daily Note   Patient: Adela Perez (29 y.o. female)   Date: 15/93/2653  Plan of Care Certification Period:  22   :  1965  MRN: [id number]  CSN: [id number]   Insurance: Payor: Kaitlin Navarro / Plan: August Rivers ARABELLA / Product Type: *No Product type* /   Insurance ID: [id number] - (Commercial) Secondary Insurance (if applicable): Connie Landin   Referring Physician: Khanh Pineda MD     PCP: Patria Singer MD Visits to Date: Total # of Visits to Date: 7   Progress note:Progress Note Counter: 7  Visits Approved: 20    No Show:    Cancelled Appts:2     Medical Diagnosis: Unspecified fracture of lower end of left humerus, initial encounter for closed fracture [S42.402A] L elbow fx s/p hardware removal        Therapy Time    Time in 1400   Time out 1500   Total treatment minutes 60   Total time code minutes  60 Minutes        OT Manual therapy 10 minutes for 1 unit(s), CPT 43262  OT Therapeutic activities 50 minutes for 3 unit(s), CPT 93357       SUBJECTIVE EXAMINATION     Patient's date of birth confirmed: Yes     Pain Level:   Pt denies pain    Patient Comments:   \"I didn't do all my exercises, but I am doing better and better with my elbow. \"    Learning/Language barrier: no     HEP/Strategies/Orthosis Compliance: Patient verbally confirmed compliant with HEP's Patient demo understanding. OBJECTIVE EXAMINATION       TREATMENT     Focus of treatment was on the following:   increasing L elbow ROM and strengthening LUE.      MFR/Manual:   Deep tissue cross friction mobilization completed deltoid muscle area of L arm and mobilization of radius/ulna at elbow tolerated well. Exercises/Activities for LUE strengthening and ROM:  With elbow supported, AROM/AAROM elbow flex/ext with forearm supination to touch hand to L ear x 10 reps.   Hold 5 sec at end range  Straight arm raises holding dowel andreea between BUES, then flexing elbows to bring dowel andreea behind head x 15 reps. AAROM of entire LUE using overhead pulleys, 15 reps flexion and 15 reps abduction. With elbow extended down next to trunk, 1 lb. sup/pronation of forearm x 15 reps. PNF patterns reaching in diagonal patterns across midline with LUE only x 10 reps  In standing using 3 lb. dowel andreea, flexing elbows to bring andreea to chin then extend overhead before returning to starting position, 10 reps. Repetitive wrist flex/extension with grasp performing wrist roll ups with 3 lb. weight x 10 reps  Overhead reaching w/ LUE removing 1,2 & 4 lb. graded clips from curtain, 15 clips    Patient Education/HEP:   Continue recommended HEP/activities. ASSESSMENT       Assessment: Pt tolerated treatment well. Pt making good  progress towards goals. Pt. reports fatigue and stretching discomfort in upper arm LUE during reaching tasks. Pt. was able to flex L elbow to touch L ear x 3 trials. Pt. requires verbal cues during tasks to not substitute motions or elevate shoulder. Pt. demo good follow through with HEP. Post Treatment Pain: Pt denies pain    Patient's Activity Tolerance: good                 GOALS         Long Term Goals  Time Frame for Long Term Goals : 6 weeks/12 visits  Long Term Goal 1: Patient will report pain 2 or less during functional activities  Long Term Goal 2: Patient  will be independent with all recommended HEP's, adaptive strategies, and adaptive techniques. Long Term Goal 3: Patient will increase AROM of L elbow from current by 10-20° to increase performance with I/ADL's  Long Term Goal 4: Patient  will increase RUE strength from current by 1/2 muscle grade  to increase performance with I/ADL's. Long Term Goal 5: Patient  will increase R  strength from current by 15-20 lbs to increase performance with I/ADL's.   Long Term Goal 6: Patient  will increase dexterity in L hand as observed by 9 hole peg test by decreasing time from current  by 2 seconds to increase performance with I/ADL's. Long Term Goal 7: Pt. will decrease soft tissue restrictions in shoulder, upper arm and elbow to be able to reach behind her back to waist level. TREATMENT PLAN   Strengthen LUE and improve ROM for pt. to return to water aerobics.            Electronically signed by POORNIMA Jones  on 10/19/2022 at 4:54 PM

## 2022-10-20 DIAGNOSIS — E03.9 HYPOTHYROIDISM, UNSPECIFIED TYPE: ICD-10-CM

## 2022-10-20 RX ORDER — LEVOTHYROXINE SODIUM 0.03 MG/1
TABLET ORAL
Qty: 30 TABLET | Refills: 1 | Status: SHIPPED | OUTPATIENT
Start: 2022-10-20

## 2022-10-20 RX ORDER — LEVOTHYROXINE SODIUM 0.03 MG/1
25 TABLET ORAL DAILY
Qty: 30 TABLET | Refills: 1 | OUTPATIENT
Start: 2022-10-20

## 2022-10-20 NOTE — TELEPHONE ENCOUNTER
Make sure patient has follow-up blood work completed and follow-up appointment.   These have been in the system

## 2022-10-21 ENCOUNTER — HOSPITAL ENCOUNTER (OUTPATIENT)
Dept: LAB | Age: 57
Discharge: HOME | End: 2022-10-21
Payer: COMMERCIAL

## 2022-10-21 DIAGNOSIS — D72.819 LEUKOPENIA, UNSPECIFIED TYPE: ICD-10-CM

## 2022-10-21 DIAGNOSIS — E03.9 HYPOTHYROIDISM, UNSPECIFIED TYPE: ICD-10-CM

## 2022-10-21 LAB
BASOPHILS ABSOLUTE: 0.1 K/UL (ref 0–0.2)
BASOPHILS RELATIVE PERCENT: 1.2 %
EOSINOPHILS ABSOLUTE: 0.2 K/UL (ref 0–0.7)
EOSINOPHILS RELATIVE PERCENT: 4.7 %
HCT VFR BLD CALC: 39.3 % (ref 37–47)
HEMOGLOBIN: 13.3 G/DL (ref 12–16)
LYMPHOCYTES ABSOLUTE: 1.6 K/UL (ref 1–4.8)
LYMPHOCYTES RELATIVE PERCENT: 33.2 %
MCH RBC QN AUTO: 29.2 PG (ref 27–31.3)
MCHC RBC AUTO-ENTMCNC: 33.8 % (ref 33–37)
MCV RBC AUTO: 86.6 FL (ref 79.4–94.8)
MONOCYTES ABSOLUTE: 0.6 K/UL (ref 0.2–0.8)
MONOCYTES RELATIVE PERCENT: 12.1 %
NEUTROPHILS ABSOLUTE: 2.4 K/UL (ref 1.4–6.5)
NEUTROPHILS RELATIVE PERCENT: 48.8 %
PDW BLD-RTO: 14.6 % (ref 11.5–14.5)
PLATELET # BLD: 303 K/UL (ref 130–400)
RBC # BLD: 4.54 M/UL (ref 4.2–5.4)
T4 FREE: 1.14 NG/DL (ref 0.84–1.68)
TSH SERPL DL<=0.05 MIU/L-ACNC: 15.48 UIU/ML (ref 0.44–3.86)
WBC # BLD: 4.8 K/UL (ref 4.8–10.8)

## 2022-10-21 PROCEDURE — 36415 COLL VENOUS BLD VENIPUNCTURE: CPT

## 2022-10-21 PROCEDURE — 84439 ASSAY OF FREE THYROXINE: CPT

## 2022-10-21 PROCEDURE — 84443 ASSAY THYROID STIM HORMONE: CPT

## 2022-10-21 PROCEDURE — 85025 COMPLETE CBC W/AUTO DIFF WBC: CPT

## 2022-10-24 ENCOUNTER — HOSPITAL ENCOUNTER (OUTPATIENT)
Dept: OCCUPATIONAL THERAPY | Age: 57
Setting detail: THERAPIES SERIES
Discharge: HOME | End: 2022-10-24
Payer: COMMERCIAL

## 2022-10-24 PROCEDURE — 97530 THERAPEUTIC ACTIVITIES: CPT

## 2022-10-24 NOTE — PROGRESS NOTES
[facility]CCUP[facility]     Occupational Therapy: Daily Note   Patient: Rosalinda Topete (19 y.o. female)   Date: 98/10/9292  Plan of Care Certification Period:  22   :  1965  MRN: [id number]  CSN: [id number]   Insurance: Payor: [place] / Plan: [place] / Product Type: *No Product type* /   Insurance ID: [id number] - (Commercial) Secondary Insurance (if applicable): Fanta Boyd   Referring Physician: Mike Trejo MD     PCP: Garfield Yañez MD Visits to Date: Total # of Visits to Date: 8   Progress note:Progress Note Counter: 8  Visits Approved: 20    No Show:    Cancelled Appts:2     Medical Diagnosis: Unspecified fracture of lower end of left humerus, initial encounter for closed fracture [S42.402A] L elbow fx s/p hardware removal        Therapy Time    Time in 1402   Time out 1446   Total treatment minutes 44   Total time code minutes  44 Minutes        OT Therapeutic activities 44 minutes for 3 unit(s), CPT 07255       SUBJECTIVE EXAMINATION     Patient's date of birth confirmed: Yes   Pt. states,  \"I have to leave a little early today about 2:45 for another appt. \"  Pain Level:   Pt denies pain    Patient Comments:   \"I have a sensitive area on the part of my forearm near my elbow, but I can live with that. \"    Learning/Language barrier: n/a     HEP/Strategies/Orthosis Compliance: Patient verbally confirmed compliant with HEP's Patient demo understanding. Restrictions: none        OBJECTIVE EXAMINATION     TREATMENT     Focus of treatment was on the following:   strengthening and endurance LUE      Exercises/Activities for LUE stengthening:  UBE at level 5 for 6 min(3 forward/3 reverse)  Overhead pulleys for flexion and abduction AAROM, 25 reps each  45* shoulder abduction w/ elbow extended AROM w/ 3 lb. dumbbell, 10 reps palm down & thumb up position.   Raising medium therapy ball overhead w/ BUEs then bouncing and catching it, 10 reps  Horizontal ab/adduction of UEs holding ball between hands w/ shoulders flexed at 90* in standing, 5 reps only due to fatigue. 6 lb. weighted bucket carry x 50 ft.  3 lb. weighted bucket, lift and place on table 5 reps w/ hand palm down, then palm up. Grasp and transfer various plyoballs weighing from 1-6 lb. with LUE. 2 x 7 balls       Patient Education/HEP:    Carrying weighted bucket w/ L elbow extended down next to side and to  weighted bucket and place on table without leaning to side or using momentum. Pt. performed 4-5 reps to perform correctly, Continue recommended HEP/activities. ASSESSMENT       Assessment: Pt reported no pain after OT treatment. Pt making good  progress towards goals. Pt. requires frequent reminders to not substitute shoulder motions when attempting elbow flexion/extension. Occasional positioning cues also needed. Pt. anxious to improve, however requires cues to pace and focus on quality, not quantity of motions. Pt. demo good understanding of HEP after several trials. No pain complaints. Pt. left session early. Post Treatment Pain: Pt denies pain    Patient's Activity Tolerance: good                 GOALS         Long Term Goals  Time Frame for Long Term Goals : 6 weeks/12 visits  Long Term Goal 1: Patient will report pain 2 or less during functional activities  Long Term Goal 2: Patient  will be independent with all recommended HEP's, adaptive strategies, and adaptive techniques. Long Term Goal 3: Patient will increase AROM of L elbow from current by 10-20° to increase performance with I/ADL's  Long Term Goal 4: Patient  will increase RUE strength from current by 1/2 muscle grade  to increase performance with I/ADL's. Long Term Goal 5: Patient  will increase R  strength from current by 15-20 lbs to increase performance with I/ADL's.   Long Term Goal 6: Patient  will increase dexterity in L hand as observed by 9 hole peg test by decreasing time from current  by 2 seconds to increase performance with I/ADL's. Long Term Goal 7: Pt. will decrease soft tissue restrictions in shoulder, upper arm and elbow to be able to reach behind her back to waist level. TREATMENT PLAN   Pt. requesting only be seen remaining 3 visits at this time, will measure progress next session.            Electronically signed by POORNIMA Pepe  on 10/24/2022 at 3:04 PM

## 2022-10-26 ENCOUNTER — HOSPITAL ENCOUNTER (OUTPATIENT)
Dept: OCCUPATIONAL THERAPY | Age: 57
Setting detail: THERAPIES SERIES
Discharge: HOME | End: 2022-10-26
Payer: COMMERCIAL

## 2022-10-26 PROCEDURE — 97530 THERAPEUTIC ACTIVITIES: CPT

## 2022-10-26 NOTE — PROGRESS NOTES
[facility] OCCUPATIONAL THERAPY     Occupational Therapy: Daily Note   Patient: Anni Weller (1 y.o. female)   Date:   Plan of Care Certification Period:  22   :  1965  MRN: [id number]  CSN: [id number]   Insurance: Payor: Enedelia Carbon / Plan: Enedelia Carbon / Product Type: *No Product type* /   Insurance ID: [id number] - (Commercial) Secondary Insurance (if applicable): Park Perkins   Referring Physician: Adama Guo MD     PCP: Nevaeh Nino MD Visits to Date: Total # of Visits to Date: 9   Progress note:Progress Note Counter: 9  Visits Approved: 20    No Show:    Cancelled Appts:2     Medical Diagnosis: Unspecified fracture of lower end of left humerus, initial encounter for closed fracture [S42.402A] L elbow fx s/p hardware removal        Therapy Time    Time in 1401   Time out 1500   Total treatment minutes 59   Total time code minutes   61        OT Therapeutic activities 59 minutes for 4 unit(s), CPT 23812       SUBJECTIVE EXAMINATION     Patient's date of birth confirmed: Yes     Pain Level:   Pt denies pain, reports discomfort with certain motions LUE. Patient Comments:   \"I think I am going to make next week my last week. I am going to [place] for several months and you gave me a lot of exercises to do. \"    Learning/Language barrier: no     HEP/Strategies/Orthosis Compliance: Patient verbally confirmed compliant with HEP's       OBJECTIVE EXAMINATION       Right   Left     MMT A P Norm  A P MMT   Shoulder                 Flexion 5/5 WFL NT 0-180 154 NT 4+/5   Abduction 5/5 WFL NT 0-180 WFL NT 4+/5   Internal Rotation 5/5 WFL NT 0-80 WFL NT 4+/5   External Rotation 5/5 WFL NT 0-60 WFL NT 4+/5   Elbow                 Flexion 5/5 WFL NT 0-150 124 NT 4+/5   Extension 5/5 WFL -0 -20 NT 4+/5   Pronation 5/5 WFL NT 0-80 WFL NT 4+/5   Supination 5/5 WFL NT 0-80 WFL NT 4+/5   Wrist                 Flexion 5/5 WFL NT 0-70 WFL NT 5/5   Extension  5/5 WFL NT 0-60 WFL NT 5/5   Ulnar deviation 5/5 WFL NT 0-30 WFL NT 5/5   Radial Deviation  5/5 WFL NT 0-20 WFL NT 5/5        Average of  Three tries     RIGHT              CURRENT      Right                 Eval      Right              Norm   LEFT  CURRENT      LEFT        Eval     Left     Norm       Beny lb. 47 47 54 35 25 46      Coordination & Dexterity    Left current on eval Norm   Nine Hole Peg Test  (seconds) 20.5 22 Female age 49-57: 19.4 s                 TREATMENT     Focus of treatment was on the following:   strengthen LUE and assess for progress toward goals     Re-measured LUE as above. Exercises/Activities:  Overhead pulleys for shoulder, elbow flex/ext and shoulder abduction w/ elbow extended, 20 reps each  Straight arm raises AROM with stretch of elbow extension toward ceiling, 10 reps  BTE tool #181 arm ladder:  pull downs, 2 x 60 sec           hand over hand pull downs BUES, 2 x 30 sec           L arm row, 1 x 60 sec         tool #131 small steering wheel:  turn CW w/ LUE x 60 sec       turn CCW w/ LUE x 60 sec  AROM elbow extension reaching down toward floor to grasp item and place on table with elbow extended, 2 x 10 reps. Patient Education/HEP:    Positioning of LUE for ease with lifting heavier items,  pt. demo fairly good understanding, Continue recommended HEP/activities. ASSESSMENT       Assessment: Pt making good  progress towards goals. Pt. reports \"pulling discomfort\" in upper arm during repetitive tasks. Pt. requires occasional verbal cues to not elevate L shoulder during elbow extension. Good follow through noted with HEP.           Post Treatment Pain: Pt denies pain    Patient's Activity Tolerance: good                 GOALS         Long Term Goals  Time Frame for Long Term Goals : 6 weeks/12 visits  Long Term Goal 1: Patient will report pain 2 or less during functional activities  Long Term Goal 2: Patient  will be independent with all recommended HEP's, adaptive strategies, and adaptive techniques. Long Term Goal 3: Patient will increase AROM of L elbow from current by 10-20° to increase performance with I/ADL's  Long Term Goal 4: Patient  will increase RUE strength from current by 1/2 muscle grade  to increase performance with I/ADL's. Long Term Goal 5: Patient  will increase R  strength from current by 15-20 lbs to increase performance with I/ADL's. Long Term Goal 6: Patient  will increase dexterity in L hand as observed by 9 hole peg test by decreasing time from current  by 2 seconds to increase performance with I/ADL's. Long Term Goal 7: Pt. will decrease soft tissue restrictions in shoulder, upper arm and elbow to be able to reach behind her back to waist level. TREATMENT PLAN   Continue strengthening and endurance tasks with LUE , as well as address difficulty in using L hand to turn steering wheel.   Pt. requested 1 more visit only due to leaving for [place] x several months.good           Electronically signed by POORNIMA Mccurdy  on 10/26/2022 at 3:27 PM

## 2022-10-31 ENCOUNTER — HOSPITAL ENCOUNTER (OUTPATIENT)
Dept: OCCUPATIONAL THERAPY | Age: 57
Setting detail: THERAPIES SERIES
Discharge: HOME | End: 2022-10-31
Payer: COMMERCIAL

## 2022-10-31 PROCEDURE — 97110 THERAPEUTIC EXERCISES: CPT

## 2022-10-31 PROCEDURE — 97530 THERAPEUTIC ACTIVITIES: CPT

## 2022-10-31 NOTE — PROGRESS NOTES
[facility] OCCUPATIONAL THERAPY     Occupational Therapy: Daily Note   Patient: Arcelia Henderson (89 y.o. female)   Date: 9752  Plan of Care Certification Period:  22   :  1965  MRN: [id number]  CSN: [id number]   Insurance: Payor: Ruben Contreras / Plan: Ruben Contreras / Product Type: *No Product type* /   Insurance ID: [id number] - (Commercial) Secondary Insurance (if applicable): Radha Granda   Referring Physician: Megan Borrego MD     PCP: Rachelle Ortega MD Visits to Date: Total # of Visits to Date: 10   Progress note:Progress Note Counter: 10  Visits Approved: 20    No Show:    Cancelled Appts:2     Medical Diagnosis: Unspecified fracture of lower end of left humerus, initial encounter for closed fracture [S42.402A] L elbow fx s/p hardware removal        Therapy Time    Time in 1400   Time out 1459   Total treatment minutes 59   Total time code minutes  61 Minutes        OT Therapeutic activities 44 minutes for 3 unit(s), CPT 97937  OT Therapeutic exercises 15 minutes for 1 unit(s), CPT 02448       SUBJECTIVE EXAMINATION     Patient's date of birth confirmed: Yes     Pain Level:   Pt denies pain    Patient Comments:   \"I am doing more on my own, I will keep working on it so I want to be done with therapy on Wednesday. \", pt stated. Learning/Language barrier: n/a     HEP/Strategies/Orthosis Compliance: Patient verbally confirmed compliant with HEP's Patient demo understanding. Restrictions: na        OBJECTIVE EXAMINATION     TREATMENT     Focus of treatment was on the following:   Strengthening LUE and improving ROM of L elbow. Exercises/Activities for LUE:  BTE work simulator for strengthening and endurance: Tool 181:  ladder pull downs from above shoulder, 9 lb. torque x 2 min  Tool 181:  ladder pull ups, 5.5 lb. torque x 1 min  Tool 802:  push/pull bar at shoulder level, 75 lb.  x 60 sec  Tool 122:  Hand bike, 12 lb. torque, 60 sec CW and 40 sec CCW  Tool 131:  Small steering wheel, 7 lb. torque x 60 sec each direction. Ball stretches lifting med therapy ball overhead w/ BUEs, elbows extended while rotating ball w/ sup/pro of forearms, 8 reps  Ball stretches on table surface for forward full extension and wrist extension to alleviate discomfort in forearm, 8 reps  Shoulder abduction to 90* w/ 1 lb. dumbbell, 10 reps. Instructed in and performed yellow theraband HEP for 2 exercises x 10 reps. Patient Education/HEP:    yellow theraband strengthening HEP, Continue recommended HEP/activities. ASSESSMENT       Assessment: Pt tolerated treatment well. Pt. reports significant fatigue in L forearm at end of session. Pt. posture improved during HEP to perform to max potential.  Pt. demo good understanding of HEP. Post Treatment Pain: Pt denies pain    Patient's Activity Tolerance: GOOD                 GOALS         Long Term Goals  Time Frame for Long Term Goals : 6 weeks/12 visits  Long Term Goal 1: Patient will report pain 2 or less during functional activities  Long Term Goal 2: Patient  will be independent with all recommended HEP's, adaptive strategies, and adaptive techniques. Long Term Goal 3: Patient will increase AROM of L elbow from current by 10-20° to increase performance with I/ADL's  Long Term Goal 4: Patient  will increase RUE strength from current by 1/2 muscle grade  to increase performance with I/ADL's. Long Term Goal 5: Patient  will increase R  strength from current by 15-20 lbs to increase performance with I/ADL's. Long Term Goal 6: Patient  will increase dexterity in L hand as observed by 9 hole peg test by decreasing time from current  by 2 seconds to increase performance with I/ADL's. Long Term Goal 7: Pt. will decrease soft tissue restrictions in shoulder, upper arm and elbow to be able to reach behind her back to waist level.          TREATMENT PLAN   Pt. to be seen 1 more visit for upgrading HEP and assessing progress.            Electronically signed by POORINMA Cummins  on 10/31/2022 at 3:06 PM

## 2022-10-31 NOTE — PROGRESS NOTES
OCCUPATIONAL THERAPY PLAN OF CARE     [facility]   [address] [facility], [address] [place]  Phone: [phone]    [] Certification     [] Recertification     [] Plan of Care    [x] Progress Note        Date: 10/31/2022    To:Referring Practitioner: Sergo Edmonds PA-C          From: Singh Flaherty, OTR/L  Patient: Sharon Viera       : 1965  MRN: [id number]  Diagnosis:Diagnosis: L elbow fx s/p hardware removal   Date of eval: 2022    Visit Information:   Onset Date: 22  OT Insurance Information: [facility] marketplace/[employer]  Total # of Visits Approved: 20  Total # of Visits to Date: 9  Certification Period Expiration Date: 22  Progress Note Due Date: 10/21/22  Canceled Appointment: 2  Progress Note Counter: 9    Last POC date: 2022   Reporting period:  -10/26/2022                          Assessment:    Goals Current/Discharge status  Met   Long Term Goal 1: Patient will report pain 2 or less during functional activities Pt. denies pain. Reports soreness only. [x] Met  [] Partially Met  [] Not Met   Long Term Goal 2: Patient  will be independent with all recommended HEP's, adaptive strategies, and adaptive techniques. Pt. has extensive HEP, needs occasional verbal cues for proper positioning during HEP. [] Met  [x] Partially Met  [] Not Met   Long Term Goal 3: Patient will increase AROM of L elbow from current by 10-20° to increase performance with I/ADL's See chart below [] Met  [x] Partially Met  [] Not Met   Long Term Goal 4: Patient  will increase RUE strength from current by 1/2 muscle grade  to increase performance with I/ADL's. See chart below [x] Met  [] Partially Met  [] Not Met   Long Term Goal 5: Patient  will increase R  strength from current by 15-20 lbs to increase performance with I/ADL's.  See chart below [] Met  [x] Partially Met  [] Not Met   Long Term Goal 6: Patient  will increase dexterity in L hand as observed by 9 hole peg test by decreasing time from current  by 2 seconds to increase performance with I/ADL's. See chart below [x] Met  [] Partially Met  [] Not Met   Long Term Goal 7: Pt. will decrease soft tissue restrictions in shoulder, upper arm and elbow to be able to reach behind her back to waist level. Pt. is able to reach behind her back and behind her head without substitution at shoulder. Pt. is indep with all ADL.  [x] Met  [] Partially Met  [] Not Met       Right   Left     MMT A P Norm  A P MMT   Shoulder                 Flexion 5/5 WFL NT 0-180 154 NT 4+/5   Abduction 5/5 WFL NT 0-180 WFL NT 4+/5   Internal Rotation 5/5 WFL NT 0-80 WFL NT 4+/5   External Rotation 5/5 WFL NT 0-60 WFL NT 4+/5   Elbow                 Flexion 5/5 WFL NT 0-150 124 NT 4+/5   Extension 5/5 WFL -0 -20 NT 4+/5   Pronation 5/5 WFL NT 0-80 WFL NT 4+/5   Supination 5/5 WFL NT 0-80 WFL NT 4+/5   Wrist                 Flexion 5/5 WFL NT 0-70 WFL NT 5/5   Extension  5/5 WFL NT 0-60 WFL NT 5/5   Ulnar deviation 5/5 WFL NT 0-30 WFL NT 5/5   Radial Deviation  5/5 WFL NT 0-20 WFL NT 5/5         Average of  Three tries     RIGHT              CURRENT      Right                 Eval      Right              Norm   LEFT  CURRENT      LEFT        Eval     Left     Norm       Beny lb. 47 47 54 35 25 46      Coordination & Dexterity    Left current on eval Norm   Nine Hole Peg Test  (seconds) 20.5 22 Female age 49-57: 23.2 s                TREATMENT PLAN:  [] Evaluate & Treat [] Neuromuscular Re-education   [x] Re-evaluation [] Tissue (stress) Loading Program   [] Pain Management [x] PROM/Stretching/AAROM/AROM   [] Edema Management [] Splinting   [] Wound Care/Scar Management [] Desensitization   [] ADL Training [x] Strengthening/Graded Therapeutic Activity   [] Tendon Repair Program [] Coordination/Dexterity Training   [x] Instruction/Application of energy [x] Manual Techniques       conservation, work simplification [x] Instruction in HEP       joint protection, body mechanics [] Aquatic Therapy   [] Modalities: [] Ultrasound   [] Infrared [] Electrical Stimulation [] Fluidotherapy                          [] Hot/Cold Pack [] Paraffin    [] Other:                             Frequency:  2 days per week  Duration:  2-4  visits     Rehab Potential: [] Excellent [x] Good  [] Fair  [] Poor      Patient Status:    [x] Continue/Initate plan of Care. Pt. requesting discharge in 1 week to continue on her own with HEPs, as she feels she is doing well and will be moving to [facility] for the winter. []  Discharge OT         []  Additional visits requested, please re-certify for additional visits        Electronically signed by: POORNIMA Lowe 10/31/2022 8:37 AM    If you have any questions or concerns, please don't hesitate to call. Thank you for your referral.      I have reviewed this plan of care and certify a need for medically necessary rehabilitation services.     Physician Signature:__________________________________________________________    Date: 10/31/2022  Please sign and return

## 2022-11-01 ENCOUNTER — OFFICE VISIT (OUTPATIENT)
Dept: FAMILY MEDICINE CLINIC | Age: 57
End: 2022-11-01
Payer: COMMERCIAL

## 2022-11-01 VITALS
SYSTOLIC BLOOD PRESSURE: 122 MMHG | TEMPERATURE: 98.2 F | HEART RATE: 75 BPM | BODY MASS INDEX: 34.45 KG/M2 | DIASTOLIC BLOOD PRESSURE: 84 MMHG | WEIGHT: 207 LBS | OXYGEN SATURATION: 99 %

## 2022-11-01 DIAGNOSIS — R05.1 ACUTE COUGH: Primary | ICD-10-CM

## 2022-11-01 DIAGNOSIS — L98.9 SKIN LESION OF LEFT LEG: ICD-10-CM

## 2022-11-01 DIAGNOSIS — E03.9 HYPOTHYROIDISM, UNSPECIFIED TYPE: ICD-10-CM

## 2022-11-01 DIAGNOSIS — R09.81 NASAL CONGESTION: ICD-10-CM

## 2022-11-01 PROCEDURE — G8484 FLU IMMUNIZE NO ADMIN: HCPCS | Performed by: FAMILY MEDICINE

## 2022-11-01 PROCEDURE — 99214 OFFICE O/P EST MOD 30 MIN: CPT | Performed by: FAMILY MEDICINE

## 2022-11-01 PROCEDURE — 87426 SARSCOV CORONAVIRUS AG IA: CPT | Performed by: FAMILY MEDICINE

## 2022-11-01 PROCEDURE — G8417 CALC BMI ABV UP PARAM F/U: HCPCS | Performed by: FAMILY MEDICINE

## 2022-11-01 PROCEDURE — G8427 DOCREV CUR MEDS BY ELIG CLIN: HCPCS | Performed by: FAMILY MEDICINE

## 2022-11-01 PROCEDURE — 3017F COLORECTAL CA SCREEN DOC REV: CPT | Performed by: FAMILY MEDICINE

## 2022-11-01 PROCEDURE — 1036F TOBACCO NON-USER: CPT | Performed by: FAMILY MEDICINE

## 2022-11-01 RX ORDER — BENZONATATE 100 MG/1
100 CAPSULE ORAL 3 TIMES DAILY PRN
Qty: 30 CAPSULE | Refills: 0 | Status: SHIPPED | OUTPATIENT
Start: 2022-11-01

## 2022-11-01 RX ORDER — FLUTICASONE PROPIONATE 50 MCG
1 SPRAY, SUSPENSION (ML) NASAL DAILY
Qty: 1 EACH | Refills: 0 | Status: SHIPPED | OUTPATIENT
Start: 2022-11-01 | End: 2022-11-23

## 2022-11-01 RX ORDER — AZITHROMYCIN 250 MG/1
TABLET, FILM COATED ORAL
Qty: 6 TABLET | Refills: 0 | Status: SHIPPED | OUTPATIENT
Start: 2022-11-01

## 2022-11-01 ASSESSMENT — ENCOUNTER SYMPTOMS
VOMITING: 0
NAUSEA: 0
APNEA: 0
DIARRHEA: 0
BLOOD IN STOOL: 0
WHEEZING: 0
SINUS PRESSURE: 0
CHEST TIGHTNESS: 0
CONSTIPATION: 0
COUGH: 1
SHORTNESS OF BREATH: 0
SORE THROAT: 0
ABDOMINAL PAIN: 0
RHINORRHEA: 0
STRIDOR: 0

## 2022-11-01 NOTE — PROGRESS NOTES
Subjective:      Patient ID: Alicia Fairchild is a 61 y.o. female who presents today for:     Chief Complaint   Patient presents with    Discuss Labs    Cough     X3 weeks        Cough  Pertinent negatives include no chest pain, ear pain, headaches, postnasal drip, rhinorrhea, sore throat, shortness of breath or wheezing. Patient is a very pleasant 65-year-old female presents today to. She has been experiencing a cough that has been nonproductive for approximately 3 weeks. She denies any fever or chills, shortness of breath or chest discomfort. She has also had significant nasal congestion over the past 3 days. She states that she has had a sinusoidal pattern in terms of getting better and then symptoms getting worse. Patient also has a history of hypothyroidism and was recently replaced on low-dose levothyroxine at 25 mcg. TSH improved but is still abnormal however free T4 levels are now within the normal range. Patient also has had an area on her left calf that has been persistent for approximately 1 year. She states that she picks at it and grazes it when she shaves her legs. She does not believe as though it has changed in color or size. She spends a lot of time in the sun and spends half of the year in [place].     Past Medical History:   Diagnosis Date    Thyroid disease      Past Surgical History:   Procedure Laterality Date    ARM SURGERY Left 8/29/2022    REMOVAL HARDWARE LEFT ELBOW WITH DEBRIDEMENT OF BONE  AND MANIPULATION UNDER ANESTHESIA performed by Kaiser Gilbert MD at [address] [place] [address] Left     5/2022 fracture repair  in [place]     Family History   Problem Relation Age of Onset    Asthma Mother     Heart Attack Mother     Other Mother     Cancer Father         pancreas cancer    Other Father     No Known Problems Daughter      Social History     Socioeconomic History    Marital status:      Spouse name: Not on file    Number of children: Not on file    Years of education: Not on file    Highest education level: Not on file   Occupational History    Not on file   Tobacco Use    Smoking status: Never    Smokeless tobacco: Never   Vaping Use    Vaping Use: Never used   Substance and Sexual Activity    Alcohol use: Yes     Alcohol/week: 0.0 standard drinks     Comment: 1 glass per week    Drug use: No    Sexual activity: Not on file   Other Topics Concern    Not on file   Social History Narrative    Not on file     Social Determinants of Health     Financial Resource Strain: Low Risk     Difficulty of Paying Living Expenses: Not hard at all   Food Insecurity: No Food Insecurity    Worried About Running Out of Food in the Last Year: Never true    Ran Out of Food in the Last Year: Never true   Transportation Needs: Not on file   Physical Activity: Not on file   Stress: Not on file   Social Connections: Not on file   Intimate Partner Violence: Not on file   Housing Stability: Not on file     Current Outpatient Medications on File Prior to Visit   Medication Sig Dispense Refill    levothyroxine (SYNTHROID) 25 MCG tablet TAKE 1 TABLET BY MOUTH EVERY DAY 30 tablet 1    ALPRAZolam (XANAX XR) 0.5 MG extended release tablet TAKE 1 TABLET BY MOUTH EVERY 8 HOURS AS NEEDED FOR ANXIETY. DO NOT CRUSH, CHEW, SPLIT      acetaminophen (TYLENOL) 500 MG tablet Take 500 mg by mouth every 6 hours as needed for Pain       No current facility-administered medications on file prior to visit. Allergies:  Penicillins    Review of Systems   Constitutional:  Negative for activity change, appetite change and fatigue. HENT:  Positive for congestion. Negative for dental problem, ear pain, hearing loss, postnasal drip, rhinorrhea, sinus pressure, sore throat and tinnitus. Respiratory:  Positive for cough. Negative for apnea, chest tightness, shortness of breath, wheezing and stridor. Cardiovascular:  Negative for chest pain, palpitations and leg swelling.    Gastrointestinal:  Negative for abdominal pain, blood in stool, constipation, diarrhea, nausea and vomiting. Musculoskeletal:  Negative for arthralgias. Neurological:  Negative for seizures and headaches. Psychiatric/Behavioral:  Negative for hallucinations and suicidal ideas. Objective:   /84   Pulse 75   Temp 98.2 °F (36.8 °C) (Infrared)   Wt 207 lb (93.9 kg)   LMP 03/02/2016   SpO2 99%   BMI 34.45 kg/m²     Physical Exam  Vitals and nursing note reviewed. Constitutional:       General: She is not in acute distress. Appearance: Normal appearance. She is well-developed. She is not diaphoretic. HENT:      Head: Normocephalic and atraumatic. Nose: Mucosal edema present. Mouth/Throat:      Mouth: Mucous membranes are moist.      Pharynx: Oropharynx is clear. Eyes:      Conjunctiva/sclera: Conjunctivae normal.      Pupils: Pupils are equal, round, and reactive to light. Cardiovascular:      Rate and Rhythm: Normal rate and regular rhythm. Heart sounds: Normal heart sounds. No murmur heard. No friction rub. No gallop. Pulmonary:      Effort: Pulmonary effort is normal. No respiratory distress. Breath sounds: Normal breath sounds. No wheezing or rales. Chest:      Chest wall: No tenderness. Abdominal:      General: Abdomen is flat. Bowel sounds are normal.      Palpations: Abdomen is soft. Tenderness: There is no abdominal tenderness. Musculoskeletal:      Cervical back: Normal range of motion. Skin:     General: Skin is warm and dry. Neurological:      Mental Status: She is alert and oriented to person, place, and time. Psychiatric:         Behavior: Behavior normal.         Thought Content: Thought content normal.         Judgment: Judgment normal.       Assessment & Plan:     1. Acute cough  Unclear etiology but due to duration of symptoms will treat with antibiotics and obtain a chest x-ray  - POCT COVID-19, Antigen  - azithromycin (ZITHROMAX) 250 MG tablet;  Take 2 tablets p.o. q. daily Day 1, then 1 tablet p.o. q. daily Days 2-5  Dispense: 6 tablet; Refill: 0  - benzonatate (TESSALON) 100 MG capsule; Take 1 capsule by mouth 3 times daily as needed for Cough  Dispense: 30 capsule; Refill: 0  - XR CHEST STANDARD (2 VW); Future    2. Nasal congestion  Will provide fluticasone for symptom relief  - fluticasone (FLONASE) 50 MCG/ACT nasal spray; 1 spray by Nasal route daily  Dispense: 1 each; Refill: 0    3. Hypothyroidism, unspecified type  Patient would like to continue thyroid dose at this time and does not want to make any increasing until she returns    4. Skin lesion of left leg  Encouraged patient to be evaluated  - GURU Navarrete MD, Dermatology, [facility]      Return in about 6 months (around 5/1/2023), or if symptoms worsen or fail to improve.     Ranjeet Mendoza MD

## 2022-11-02 ENCOUNTER — HOSPITAL ENCOUNTER (OUTPATIENT)
Dept: OCCUPATIONAL THERAPY | Age: 57
Setting detail: THERAPIES SERIES
Discharge: HOME | End: 2022-11-02
Payer: COMMERCIAL

## 2022-11-02 PROCEDURE — 97530 THERAPEUTIC ACTIVITIES: CPT

## 2022-11-02 NOTE — PROGRESS NOTES
[facility] OCCUPATIONAL THERAPY     Occupational Therapy: Daily Note   Patient: Sharon Viera (1 y.o. female)   Date:   Plan of Care Certification Period:  22   :  1965  MRN: [id number]  CSN: [id number]   Insurance: Payor: [employer] [place] / Plan: [employer] [place] / Product Type: *No Product type* /   Insurance ID: [id number] - (Commercial) Secondary Insurance (if applicable): Hemalatha Espinoza   Referring Physician: Laverne Salcedo MD     PCP: Norvel Cushing, MD Visits to Date: Total # of Visits to Date: 6   Progress note:Progress Note Counter: 11  Visits Approved: 20    No Show:    Cancelled Appts:2     Medical Diagnosis: Unspecified fracture of lower end of left humerus, initial encounter for closed fracture [S42.402A] L elbow fx s/p hardware removal        Therapy Time    Time in 1401   Time out 1500   Total treatment minutes 59   Total time code minutes  61 Minutes        OT Therapeutic activities 59 minutes for 4 unit(s), CPT 56494       SUBJECTIVE EXAMINATION     Patient's date of birth confirmed: Yes     Pain Level:   Pt denies pain    Patient Comments:   \"I wasn't sore at all after last session, I think I am good to go on my own. \"    Learning/Language barrier: no     HEP/Strategies/Orthosis Compliance: Patient verbally confirmed compliant with HEP's Patient demo understanding. Restrictions: none         OBJECTIVE EXAMINATION   LUE AROM:    shoulder flexion: 158      elbow flexion:    129       elbow extension:  -13       Average of  Three tries   LEFT  CURRENT      LEFT        Eval     Left     Norm       Beny lb. 39 25 46   PALMAR  Pinch  Lb. 11 11 12.5   LATERAL  Pinch  Lb. 13 11 11      TREATMENT     Focus of treatment was on the following:   upgrading HEP, endurance tasks and assess for progress toward goals     Re-measured LUE as above. Exercises/Activities LUE:  BTE TOOL:# 131  small steering wheel at 7 lbs for 75 sec each direction.               # 122 hand bike, 12 lb. for 60 sec CW and 60 sec CCW. #802 PUSH/PULL BAR, 75 LB. X 75 sec  Overhead stretch LUE reaching overhead and hold x 5 sec without leaning body, 10 reps  Instructed in and performed 7stretching and strengthening w/ yellow theraband HEP, written instruction provided. Performed 10 reps each    Patient Education/HEP:   Continue recommended HEP/activities. , left  UE strengthening, Pt completed 10 reps to demo understanding. Pt with good follow through., Written hand out(s) provided. ASSESSMENT       Assessment: Pt tolerated treatment well. Pt. continues to progress as she has gained 3-5* motion and increased  4 lb. since last week, 14 lb. since eval.  Pt. demo good follow through with HEP after several reps and cues for positioning. Pt. has written HEP for improved follow through. Pt. reports no pain, however continues to have limited elbow flexion LUE. Pt. reports she is performing all ADL without difficulty. Post Treatment Pain: Pt denies pain    Patient's Activity Tolerance: good                 GOALS         Long Term Goals  Time Frame for Long Term Goals : 6 weeks/12 visits  Long Term Goal 1: Patient will report pain 2 or less during functional activities  Long Term Goal 2: Patient  will be independent with all recommended HEP's, adaptive strategies, and adaptive techniques. Long Term Goal 3: Patient will increase AROM of L elbow from current by 10-20° to increase performance with I/ADL's  Long Term Goal 4: Patient  will increase RUE strength from current by 1/2 muscle grade  to increase performance with I/ADL's. Long Term Goal 5: Patient  will increase R  strength from current by 15-20 lbs to increase performance with I/ADL's. Long Term Goal 6: Patient  will increase dexterity in L hand as observed by 9 hole peg test by decreasing time from current  by 2 seconds to increase performance with I/ADL's.   Long Term Goal 7: Pt. will decrease soft tissue restrictions in shoulder, upper arm and elbow to be able to reach behind her back to waist level. TREATMENT PLAN   D/C from OP OT per pt. request to perform extensive HEP for continued progress while traveling to [place] for winter. Pt. to follow up with physician.            Electronically signed by POORNIMA Olvera  on 11/2/2022 at 4:01 PM

## 2022-11-02 NOTE — PROGRESS NOTES
[address] [facility]    [facility] [address] [place], [address] [facility]  Ph: [phone]   Fax: [phone]      Date: 2022    To: Kory Hernandes MD From: Raimundo Hayes, OTR/L   Patient: Rajwinder Bautista : 1965   MRN: [id number] Diagnosis: Unspecified fracture of lower end of left humerus, initial encounter for closed fracture [S42.402A] Diagnosis: L elbow fx s/p hardware removal     Date of eval: 2022    Visit Information:   Onset Date: 22  OT Insurance Information: [employer]  Total # of Visits Approved: 20  Total # of Visits to Date: 6  Certification Period Expiration Date: 22  Progress Note Due Date: 22  Canceled Appointment: 2  Progress Note Counter: 11                              Assessment:      Long Term Goals Current/Discharge status  Met   Long Term Goal 1: Patient will report pain 2 or less during functional activities  [x] Met  [] Partially Met  [] Not Met   Long Term Goal 2: Patient  will be independent with all recommended HEP's, adaptive strategies, and adaptive techniques. Written handouts provided for improved follow through. [x] Met  [] Partially Met  [] Not Met   Long Term Goal 3: Patient will increase AROM of L elbow from current by 10-20° to increase performance with I/ADL's Improved 7-9* elbow motion. See chart below for current ROM. [] Met  [x] Partially Met  [] Not Met   Long Term Goal 4: Patient  will increase RUE strength from current by 1/2 muscle grade  to increase performance with I/ADL's. Muscle strength 4 to 4+/5 throughout LUE. [x] Met  [] Partially Met  [] Not Met   Long Term Goal 5: Patient  will increase R  strength from current by 15-20 lbs to increase performance with I/ADL's. Improved 14 lbs.   See chart below [] Met  [x] Partially Met  [] Not Met   Long Term Goal 6: Patient  will increase dexterity in L hand as observed by 9 hole peg test by decreasing time from current  by 2 seconds to increase performance with I/ADL's. [x] Met  [] Partially Met  [] Not Met   Long Term Goal 7: Pt. will decrease soft tissue restrictions in shoulder, upper arm and elbow to be able to reach behind her back to waist level. Pt. is able to reach all levels with LUE with mild substitution of motion from shoulder and wrist to obtain full reach. [x] Met  [] Partially Met  [] Not Met     LUE AROM:    shoulder flexion: 158                          elbow flexion:    129                          elbow extension:  -13        Average of  Three tries   LEFT  CURRENT      LEFT        Eval     Left     Norm       Beny lb. 39 25 46   PALMAR  Pinch  Lb. 11 11 12.5   LATERAL  Pinch  Lb. 13 11 11     Functional assessment used: Upper Extremity Functional Index  Score on eval: 75/80=93.75%  Score at d/c: 79/80=98.75%    Plan: D/C from OT per pt. request as moving to [place] for Winter. Pt. to continue with extensive HEP. Thank you for referral of this patient. Electronically signed by:   POORNIMA Pepe 11/2/2022 4:13 PM

## 2022-11-17 DIAGNOSIS — E03.9 HYPOTHYROIDISM, UNSPECIFIED TYPE: ICD-10-CM

## 2022-11-17 NOTE — TELEPHONE ENCOUNTER
Pharmacy is requesting medication refill. Please approve or deny this request.    Rx requested:  Requested Prescriptions     Pending Prescriptions Disp Refills    levothyroxine (SYNTHROID) 25 MCG tablet [Pharmacy Med Name: LEVOTHYROXINE 25 MCG TABLET] 30 tablet 1     Sig: TAKE 1 TABLET BY MOUTH EVERY DAY         Last Office Visit:   11/1/2022      Next Visit Date:  No future appointments.

## 2022-11-18 RX ORDER — LEVOTHYROXINE SODIUM 0.03 MG/1
TABLET ORAL
Qty: 30 TABLET | Refills: 1 | Status: SHIPPED | OUTPATIENT
Start: 2022-11-18

## 2022-11-21 ENCOUNTER — TELEPHONE (OUTPATIENT)
Dept: ORTHOPEDIC SURGERY | Age: 57
End: 2022-11-21

## 2022-11-21 NOTE — TELEPHONE ENCOUNTER
Patient missed her last appointment. Patient typically spends half of the year in [place] and does not feel she can make a follow-up appointment. Patient informed me that she has essentially regained full function of the left elbow has no concerns. Happy with the results. Given that she is doing so well do not need further follow-up at this time. We will see patient back again on an as-needed basis.

## 2022-11-23 ENCOUNTER — TELEPHONE (OUTPATIENT)
Dept: ORTHOPEDIC SURGERY | Age: 57
End: 2022-11-23

## 2022-11-23 DIAGNOSIS — R09.81 NASAL CONGESTION: ICD-10-CM

## 2022-11-23 RX ORDER — FLUTICASONE PROPIONATE 50 MCG
SPRAY, SUSPENSION (ML) NASAL
Qty: 50 G | Refills: 2 | Status: SHIPPED | OUTPATIENT
Start: 2022-11-23

## 2022-11-23 NOTE — TELEPHONE ENCOUNTER
Comments:     Last Office Visit (last PCP visit):   11/1/2022    Next Visit Date:  No future appointments. **If hasn't been seen in over a year OR hasn't followed up according to last diabetes/ADHD visit, make appointment for patient before sending refill to provider.     Rx requested:  Requested Prescriptions     Pending Prescriptions Disp Refills    fluticasone (FLONASE) 50 MCG/ACT nasal spray [Pharmacy Med Name: FLUTICASONE PROP 50 MCG SPRAY] 50 g 1     Sig: SPRAY 1 SPRAY BY NASAL ROUTE EVERY DAY

## 2022-12-10 DIAGNOSIS — E03.9 HYPOTHYROIDISM, UNSPECIFIED TYPE: ICD-10-CM

## 2022-12-12 RX ORDER — LEVOTHYROXINE SODIUM 0.03 MG/1
TABLET ORAL
Qty: 30 TABLET | Refills: 1 | Status: SHIPPED | OUTPATIENT
Start: 2022-12-12

## 2022-12-30 DIAGNOSIS — E03.9 HYPOTHYROIDISM, UNSPECIFIED TYPE: ICD-10-CM

## 2022-12-30 RX ORDER — LEVOTHYROXINE SODIUM 0.03 MG/1
TABLET ORAL
Qty: 90 TABLET | Refills: 1 | Status: SHIPPED | OUTPATIENT
Start: 2022-12-30

## 2022-12-30 NOTE — TELEPHONE ENCOUNTER
Comments: Pharmacy requesting 90 day     Last Office Visit (last PCP visit):   11/1/2022    Next Visit Date:  No future appointments. **If hasn't been seen in over a year OR hasn't followed up according to last diabetes/ADHD visit, make appointment for patient before sending refill to provider.     Rx requested:  Requested Prescriptions     Pending Prescriptions Disp Refills    levothyroxine (SYNTHROID) 25 MCG tablet 90 tablet 1     Sig: TAKE 1 TABLET BY MOUTH EVERY DAY

## 2023-02-01 ENCOUNTER — TELEPHONE (OUTPATIENT)
Dept: GASTROENTEROLOGY | Age: 58
End: 2023-02-01

## 2023-07-19 ENCOUNTER — HOSPITAL ENCOUNTER (OUTPATIENT)
Dept: WOMENS IMAGING | Age: 58
Discharge: HOME | End: 2023-07-21
Payer: COMMERCIAL

## 2023-07-19 DIAGNOSIS — Z12.31 BREAST CANCER SCREENING BY MAMMOGRAM: ICD-10-CM

## 2023-07-19 PROCEDURE — 77067 SCR MAMMO BI INCL CAD: CPT

## 2024-07-22 ENCOUNTER — HOSPITAL ENCOUNTER (OUTPATIENT)
Dept: WOMENS IMAGING | Age: 59
Discharge: HOME | End: 2024-07-24
Payer: COMMERCIAL

## 2024-07-22 DIAGNOSIS — Z12.31 SCREENING MAMMOGRAM FOR HIGH-RISK PATIENT: ICD-10-CM

## 2024-07-22 PROCEDURE — 77063 BREAST TOMOSYNTHESIS BI: CPT

## 2024-11-14 ENCOUNTER — OFFICE VISIT (OUTPATIENT)
Age: 59
End: 2024-11-14
Payer: COMMERCIAL

## 2024-11-14 VITALS
SYSTOLIC BLOOD PRESSURE: 126 MMHG | WEIGHT: 214 LBS | DIASTOLIC BLOOD PRESSURE: 80 MMHG | HEART RATE: 63 BPM | HEIGHT: 66 IN | BODY MASS INDEX: 34.39 KG/M2 | OXYGEN SATURATION: 98 %

## 2024-11-14 DIAGNOSIS — R31.9 HEMATURIA, UNSPECIFIED TYPE: ICD-10-CM

## 2024-11-14 DIAGNOSIS — R10.84 GENERALIZED ABDOMINAL PAIN: ICD-10-CM

## 2024-11-14 DIAGNOSIS — K30 INDIGESTION: ICD-10-CM

## 2024-11-14 DIAGNOSIS — R31.9 HEMATURIA, UNSPECIFIED TYPE: Primary | ICD-10-CM

## 2024-11-14 LAB
ALBUMIN SERPL-MCNC: 4.6 G/DL (ref 3.5–4.6)
ALP SERPL-CCNC: 73 U/L (ref 40–130)
ALT SERPL-CCNC: 19 U/L (ref 0–33)
ANION GAP SERPL CALCULATED.3IONS-SCNC: 9 MEQ/L (ref 9–15)
AST SERPL-CCNC: 20 U/L (ref 0–35)
BASOPHILS # BLD: 0.1 K/UL (ref 0–0.2)
BASOPHILS NFR BLD: 1.1 %
BILIRUB SERPL-MCNC: 0.9 MG/DL (ref 0.2–0.7)
BILIRUBIN, POC: NORMAL
BLOOD URINE, POC: NORMAL
BUN SERPL-MCNC: 8 MG/DL (ref 8–23)
CALCIUM SERPL-MCNC: 9.2 MG/DL (ref 8.5–9.9)
CHLORIDE SERPL-SCNC: 100 MEQ/L (ref 95–107)
CLARITY, POC: NORMAL
CO2 SERPL-SCNC: 28 MEQ/L (ref 20–31)
COLOR, POC: YELLOW
CREAT SERPL-MCNC: 0.79 MG/DL (ref 0.5–0.9)
EOSINOPHIL # BLD: 0.1 K/UL (ref 0–0.7)
EOSINOPHIL NFR BLD: 2.5 %
ERYTHROCYTE [DISTWIDTH] IN BLOOD BY AUTOMATED COUNT: 13.9 % (ref 11.5–14.5)
GLOBULIN SER CALC-MCNC: 2.7 G/DL (ref 2.3–3.5)
GLUCOSE SERPL-MCNC: 101 MG/DL (ref 70–99)
GLUCOSE URINE, POC: NORMAL MG/DL
HCT VFR BLD AUTO: 42.2 % (ref 37–47)
HGB BLD-MCNC: 14.8 G/DL (ref 12–16)
KETONES, POC: NORMAL MG/DL
LEUKOCYTE EST, POC: NORMAL
LYMPHOCYTES # BLD: 1.5 K/UL (ref 1–4.8)
LYMPHOCYTES NFR BLD: 32.3 %
MCH RBC QN AUTO: 30.6 PG (ref 27–31.3)
MCHC RBC AUTO-ENTMCNC: 35.1 % (ref 33–37)
MCV RBC AUTO: 87.4 FL (ref 79.4–94.8)
MONOCYTES # BLD: 0.4 K/UL (ref 0.2–0.8)
MONOCYTES NFR BLD: 7.6 %
NEUTROPHILS # BLD: 2.7 K/UL (ref 1.4–6.5)
NEUTS SEG NFR BLD: 56.3 %
NITRITE, POC: NORMAL
PH, POC: 5.5
PLATELET # BLD AUTO: 297 K/UL (ref 130–400)
POTASSIUM SERPL-SCNC: 3.8 MEQ/L (ref 3.4–4.9)
PROT SERPL-MCNC: 7.3 G/DL (ref 6.3–8)
PROTEIN, POC: NORMAL MG/DL
RBC # BLD AUTO: 4.83 M/UL (ref 4.2–5.4)
SODIUM SERPL-SCNC: 137 MEQ/L (ref 135–144)
SPECIFIC GRAVITY, POC: 1.03
UROBILINOGEN, POC: 0.2 MG/DL
WBC # BLD AUTO: 4.7 K/UL (ref 4.8–10.8)

## 2024-11-14 PROCEDURE — 99214 OFFICE O/P EST MOD 30 MIN: CPT | Performed by: PHYSICIAN ASSISTANT

## 2024-11-14 PROCEDURE — G8419 CALC BMI OUT NRM PARAM NOF/U: HCPCS | Performed by: PHYSICIAN ASSISTANT

## 2024-11-14 PROCEDURE — 1036F TOBACCO NON-USER: CPT | Performed by: PHYSICIAN ASSISTANT

## 2024-11-14 PROCEDURE — 81003 URINALYSIS AUTO W/O SCOPE: CPT | Performed by: PHYSICIAN ASSISTANT

## 2024-11-14 PROCEDURE — 3017F COLORECTAL CA SCREEN DOC REV: CPT | Performed by: PHYSICIAN ASSISTANT

## 2024-11-14 PROCEDURE — G8427 DOCREV CUR MEDS BY ELIG CLIN: HCPCS | Performed by: PHYSICIAN ASSISTANT

## 2024-11-14 PROCEDURE — G8484 FLU IMMUNIZE NO ADMIN: HCPCS | Performed by: PHYSICIAN ASSISTANT

## 2024-11-14 RX ORDER — OMEPRAZOLE 40 MG/1
40 CAPSULE, DELAYED RELEASE ORAL
Qty: 30 CAPSULE | Refills: 0 | Status: SHIPPED | OUTPATIENT
Start: 2024-11-14

## 2024-11-14 SDOH — ECONOMIC STABILITY: INCOME INSECURITY: HOW HARD IS IT FOR YOU TO PAY FOR THE VERY BASICS LIKE FOOD, HOUSING, MEDICAL CARE, AND HEATING?: PATIENT DECLINED

## 2024-11-14 SDOH — ECONOMIC STABILITY: TRANSPORTATION INSECURITY
IN THE PAST 12 MONTHS, HAS LACK OF TRANSPORTATION KEPT YOU FROM MEETINGS, WORK, OR FROM GETTING THINGS NEEDED FOR DAILY LIVING?: PATIENT DECLINED

## 2024-11-14 SDOH — ECONOMIC STABILITY: FOOD INSECURITY: WITHIN THE PAST 12 MONTHS, YOU WORRIED THAT YOUR FOOD WOULD RUN OUT BEFORE YOU GOT MONEY TO BUY MORE.: PATIENT DECLINED

## 2024-11-14 SDOH — ECONOMIC STABILITY: FOOD INSECURITY: WITHIN THE PAST 12 MONTHS, THE FOOD YOU BOUGHT JUST DIDN'T LAST AND YOU DIDN'T HAVE MONEY TO GET MORE.: PATIENT DECLINED

## 2024-11-14 ASSESSMENT — ENCOUNTER SYMPTOMS
NAUSEA: 1
VOMITING: 0
ABDOMINAL PAIN: 0
FACIAL SWELLING: 0

## 2024-11-14 ASSESSMENT — PATIENT HEALTH QUESTIONNAIRE - PHQ9
1. LITTLE INTEREST OR PLEASURE IN DOING THINGS: NOT AT ALL
2. FEELING DOWN, DEPRESSED OR HOPELESS: NOT AT ALL
SUM OF ALL RESPONSES TO PHQ QUESTIONS 1-9: 0
SUM OF ALL RESPONSES TO PHQ QUESTIONS 1-9: 0
SUM OF ALL RESPONSES TO PHQ9 QUESTIONS 1 & 2: 0
SUM OF ALL RESPONSES TO PHQ QUESTIONS 1-9: 0
SUM OF ALL RESPONSES TO PHQ QUESTIONS 1-9: 0

## 2024-11-14 ASSESSMENT — VISUAL ACUITY: OU: 1

## 2024-11-14 NOTE — PROGRESS NOTES
Subjective  Kiersten Santillan 1965 is a 59 y.o. female who presents today with:  Chief Complaint   Patient presents with    Hematuria     Pt noticed blood in her urine,Pt states no blood in urine today  Pt states her stomach is upset   Pt is taking otc for sinus and cough        Hematuria  This is a new problem. The current episode started yesterday. The problem is unchanged. She describes the hematuria as gross hematuria. She describes her urine color as tea-colored. Irritative symptoms do not include frequency, nocturia or urgency. Obstructive symptoms do not include dribbling, incomplete emptying, an intermittent stream, a slower stream, straining or a weak stream. Associated symptoms include nausea. Pertinent negatives include no abdominal pain, chills, dysuria, facial swelling, flank pain, genital pain, inability to urinate, vomiting or weight loss. There is no history of BPH, hypertension, sickle cell disease or STDs.       LMP - 5 years ago. In menopause. No vaginal bleeding or dischrge.     Review of Systems   Constitutional:  Negative for chills and weight loss.   HENT:  Negative for facial swelling.    Gastrointestinal:  Positive for nausea. Negative for abdominal pain and vomiting.   Genitourinary:  Positive for hematuria. Negative for decreased urine volume, difficulty urinating, dyspareunia, dysuria, enuresis, flank pain, frequency, incomplete emptying, nocturia, urgency, vaginal bleeding, vaginal discharge and vaginal pain.       Past Medical History:   Diagnosis Date    Thyroid disease      Past Surgical History:   Procedure Laterality Date    ARM SURGERY Left 8/29/2022    REMOVAL HARDWARE LEFT ELBOW WITH DEBRIDEMENT OF BONE  AND MANIPULATION UNDER ANESTHESIA [place] [facility] [place]    [place]BOW SURGERY Left     5/2022 fracture repair  in [place]     Social History     Socioeconomic History    Marital status:      Spouse name: Not on file    Number of children: Not on

## 2024-12-06 DIAGNOSIS — R10.84 GENERALIZED ABDOMINAL PAIN: ICD-10-CM

## 2024-12-06 DIAGNOSIS — K30 INDIGESTION: ICD-10-CM

## 2024-12-06 NOTE — TELEPHONE ENCOUNTER
Comments:     Last Office Visit (last PCP visit):   11/1/2022    Next Visit Date:  No future appointments.    **If hasn't been seen in over a year OR hasn't followed up according to last diabetes/ADHD visit, make appointment for patient before sending refill to provider.    Rx requested:  Requested Prescriptions     Pending Prescriptions Disp Refills    omeprazole (PRILOSEC) 40 MG delayed release capsule 30 capsule 0     Sig: Take 1 capsule by mouth every morning (before breakfast)

## 2024-12-09 DIAGNOSIS — R10.84 GENERALIZED ABDOMINAL PAIN: ICD-10-CM

## 2024-12-09 DIAGNOSIS — K30 INDIGESTION: ICD-10-CM

## 2024-12-11 RX ORDER — OMEPRAZOLE 40 MG/1
40 CAPSULE, DELAYED RELEASE ORAL
Qty: 30 CAPSULE | Refills: 0 | Status: SHIPPED | OUTPATIENT
Start: 2024-12-11

## 2024-12-11 RX ORDER — OMEPRAZOLE 40 MG/1
40 CAPSULE, DELAYED RELEASE ORAL
Qty: 30 CAPSULE | Refills: 0 | OUTPATIENT
Start: 2024-12-11

## 2024-12-13 NOTE — TELEPHONE ENCOUNTER
Pt called back to let PCP know that she is going out of town and will call back to schedule an appt when she returns.

## 2025-01-09 ENCOUNTER — TELEPHONE (OUTPATIENT)
Dept: GASTROENTEROLOGY | Age: 60
End: 2025-01-09

## 2025-01-09 NOTE — TELEPHONE ENCOUNTER
Called [facility] Colonoscopy scheduling LMOM.Please call [phone] ext [phone] schedule colon screening .

## 2025-06-24 ENCOUNTER — OFFICE VISIT (OUTPATIENT)
Age: 60
End: 2025-06-24
Payer: COMMERCIAL

## 2025-06-24 VITALS
DIASTOLIC BLOOD PRESSURE: 76 MMHG | OXYGEN SATURATION: 99 % | SYSTOLIC BLOOD PRESSURE: 122 MMHG | WEIGHT: 215.8 LBS | HEART RATE: 75 BPM | BODY MASS INDEX: 34.83 KG/M2 | TEMPERATURE: 97.5 F

## 2025-06-24 DIAGNOSIS — R10.84 GENERALIZED ABDOMINAL PAIN: ICD-10-CM

## 2025-06-24 DIAGNOSIS — E06.3 HASHIMOTO THYROIDITIS: Primary | ICD-10-CM

## 2025-06-24 DIAGNOSIS — Z12.39 BREAST SCREENING: ICD-10-CM

## 2025-06-24 DIAGNOSIS — Z13.220 LIPID SCREENING: ICD-10-CM

## 2025-06-24 DIAGNOSIS — R73.9 HYPERGLYCEMIA: ICD-10-CM

## 2025-06-24 DIAGNOSIS — R20.0 NUMBNESS OF RIGHT ANTERIOR THIGH: ICD-10-CM

## 2025-06-24 DIAGNOSIS — M79.651 RIGHT THIGH PAIN: ICD-10-CM

## 2025-06-24 PROCEDURE — G8427 DOCREV CUR MEDS BY ELIG CLIN: HCPCS | Performed by: FAMILY MEDICINE

## 2025-06-24 PROCEDURE — G8417 CALC BMI ABV UP PARAM F/U: HCPCS | Performed by: FAMILY MEDICINE

## 2025-06-24 PROCEDURE — 3017F COLORECTAL CA SCREEN DOC REV: CPT | Performed by: FAMILY MEDICINE

## 2025-06-24 PROCEDURE — 99214 OFFICE O/P EST MOD 30 MIN: CPT | Performed by: FAMILY MEDICINE

## 2025-06-24 PROCEDURE — 1036F TOBACCO NON-USER: CPT | Performed by: FAMILY MEDICINE

## 2025-06-24 SDOH — ECONOMIC STABILITY: FOOD INSECURITY: WITHIN THE PAST 12 MONTHS, YOU WORRIED THAT YOUR FOOD WOULD RUN OUT BEFORE YOU GOT MONEY TO BUY MORE.: NEVER TRUE

## 2025-06-24 SDOH — ECONOMIC STABILITY: FOOD INSECURITY: WITHIN THE PAST 12 MONTHS, THE FOOD YOU BOUGHT JUST DIDN'T LAST AND YOU DIDN'T HAVE MONEY TO GET MORE.: NEVER TRUE

## 2025-06-24 ASSESSMENT — PATIENT HEALTH QUESTIONNAIRE - PHQ9
10. IF YOU CHECKED OFF ANY PROBLEMS, HOW DIFFICULT HAVE THESE PROBLEMS MADE IT FOR YOU TO DO YOUR WORK, TAKE CARE OF THINGS AT HOME, OR GET ALONG WITH OTHER PEOPLE: NOT DIFFICULT AT ALL
8. MOVING OR SPEAKING SO SLOWLY THAT OTHER PEOPLE COULD HAVE NOTICED. OR THE OPPOSITE, BEING SO FIGETY OR RESTLESS THAT YOU HAVE BEEN MOVING AROUND A LOT MORE THAN USUAL: NOT AT ALL
1. LITTLE INTEREST OR PLEASURE IN DOING THINGS: NOT AT ALL
SUM OF ALL RESPONSES TO PHQ QUESTIONS 1-9: 0
6. FEELING BAD ABOUT YOURSELF - OR THAT YOU ARE A FAILURE OR HAVE LET YOURSELF OR YOUR FAMILY DOWN: NOT AT ALL
SUM OF ALL RESPONSES TO PHQ QUESTIONS 1-9: 0
9. THOUGHTS THAT YOU WOULD BE BETTER OFF DEAD, OR OF HURTING YOURSELF: NOT AT ALL
3. TROUBLE FALLING OR STAYING ASLEEP: NOT AT ALL
4. FEELING TIRED OR HAVING LITTLE ENERGY: NOT AT ALL
SUM OF ALL RESPONSES TO PHQ QUESTIONS 1-9: 0
7. TROUBLE CONCENTRATING ON THINGS, SUCH AS READING THE NEWSPAPER OR WATCHING TELEVISION: NOT AT ALL
2. FEELING DOWN, DEPRESSED OR HOPELESS: NOT AT ALL
5. POOR APPETITE OR OVEREATING: NOT AT ALL
SUM OF ALL RESPONSES TO PHQ QUESTIONS 1-9: 0

## 2025-06-25 ENCOUNTER — HOSPITAL ENCOUNTER (OUTPATIENT)
Dept: GENERAL RADIOLOGY | Age: 60
Discharge: HOME | End: 2025-06-27
Attending: FAMILY MEDICINE
Payer: COMMERCIAL

## 2025-06-25 ENCOUNTER — HOSPITAL ENCOUNTER (OUTPATIENT)
Age: 60
Discharge: HOME | End: 2025-06-27
Payer: COMMERCIAL

## 2025-06-25 ENCOUNTER — HOSPITAL ENCOUNTER (OUTPATIENT)
Dept: LAB | Age: 60
Discharge: HOME | End: 2025-06-25
Payer: COMMERCIAL

## 2025-06-25 DIAGNOSIS — R73.9 HYPERGLYCEMIA: ICD-10-CM

## 2025-06-25 DIAGNOSIS — M79.651 RIGHT THIGH PAIN: ICD-10-CM

## 2025-06-25 DIAGNOSIS — R20.0 NUMBNESS OF RIGHT ANTERIOR THIGH: ICD-10-CM

## 2025-06-25 DIAGNOSIS — E06.3 HASHIMOTO THYROIDITIS: ICD-10-CM

## 2025-06-25 DIAGNOSIS — R10.84 GENERALIZED ABDOMINAL PAIN: ICD-10-CM

## 2025-06-25 DIAGNOSIS — Z13.220 LIPID SCREENING: ICD-10-CM

## 2025-06-25 LAB
ALBUMIN SERPL-MCNC: 4.6 G/DL (ref 3.5–4.6)
ALP SERPL-CCNC: 61 U/L (ref 40–130)
ALT SERPL-CCNC: 13 U/L (ref 0–33)
ANION GAP SERPL CALCULATED.3IONS-SCNC: 12 MEQ/L (ref 9–15)
AST SERPL-CCNC: 20 U/L (ref 0–35)
BASOPHILS # BLD: 0.1 K/UL (ref 0–0.2)
BASOPHILS NFR BLD: 1.2 %
BILIRUB SERPL-MCNC: 0.7 MG/DL (ref 0.2–0.7)
BUN SERPL-MCNC: 11 MG/DL (ref 8–23)
CALCIUM SERPL-MCNC: 9.1 MG/DL (ref 8.5–9.9)
CHLORIDE SERPL-SCNC: 103 MEQ/L (ref 95–107)
CHOLEST SERPL-MCNC: 171 MG/DL (ref 0–199)
CO2 SERPL-SCNC: 26 MEQ/L (ref 20–31)
CREAT SERPL-MCNC: 0.87 MG/DL (ref 0.5–0.9)
EOSINOPHIL # BLD: 0.1 K/UL (ref 0–0.7)
EOSINOPHIL NFR BLD: 2.3 %
ERYTHROCYTE [DISTWIDTH] IN BLOOD BY AUTOMATED COUNT: 13.4 % (ref 11.5–14.5)
GLOBULIN SER CALC-MCNC: 2.5 G/DL (ref 2.3–3.5)
GLUCOSE SERPL-MCNC: 90 MG/DL (ref 70–99)
HCT VFR BLD AUTO: 42.2 % (ref 37–47)
HDLC SERPL-MCNC: 55 MG/DL (ref 40–59)
HGB BLD-MCNC: 14 G/DL (ref 12–16)
LDL CHOLESTEROL: 92 MG/DL (ref 0–129)
LYMPHOCYTES # BLD: 1.7 K/UL (ref 1–4.8)
LYMPHOCYTES NFR BLD: 32.1 %
MCH RBC QN AUTO: 29.9 PG (ref 27–31.3)
MCHC RBC AUTO-ENTMCNC: 33.2 % (ref 33–37)
MCV RBC AUTO: 90 FL (ref 79.4–94.8)
MONOCYTES # BLD: 0.4 K/UL (ref 0.2–0.8)
MONOCYTES NFR BLD: 6.9 %
NEUTROPHILS # BLD: 3 K/UL (ref 1.4–6.5)
NEUTS SEG NFR BLD: 57.3 %
PLATELET # BLD AUTO: 275 K/UL (ref 130–400)
POTASSIUM SERPL-SCNC: 4.1 MEQ/L (ref 3.4–4.9)
PROT SERPL-MCNC: 7.1 G/DL (ref 6.3–8)
RBC # BLD AUTO: 4.69 M/UL (ref 4.2–5.4)
SODIUM SERPL-SCNC: 141 MEQ/L (ref 135–144)
T4 FREE SERPL-MCNC: 0.77 NG/DL (ref 0.84–1.68)
TRIGLYCERIDE, FASTING: 122 MG/DL (ref 0–150)
TSH SERPL-MCNC: 41.78 UIU/ML (ref 0.44–3.86)
WBC # BLD AUTO: 5.2 K/UL (ref 4.8–10.8)

## 2025-06-25 PROCEDURE — 86376 MICROSOMAL ANTIBODY EACH: CPT

## 2025-06-25 PROCEDURE — 80061 LIPID PANEL: CPT

## 2025-06-25 PROCEDURE — 72110 X-RAY EXAM L-2 SPINE 4/>VWS: CPT

## 2025-06-25 PROCEDURE — 80053 COMPREHEN METABOLIC PANEL: CPT

## 2025-06-25 PROCEDURE — 83036 HEMOGLOBIN GLYCOSYLATED A1C: CPT

## 2025-06-25 PROCEDURE — 36415 COLL VENOUS BLD VENIPUNCTURE: CPT

## 2025-06-25 PROCEDURE — 84439 ASSAY OF FREE THYROXINE: CPT

## 2025-06-25 PROCEDURE — 73502 X-RAY EXAM HIP UNI 2-3 VIEWS: CPT

## 2025-06-25 PROCEDURE — 84443 ASSAY THYROID STIM HORMONE: CPT

## 2025-06-25 PROCEDURE — 85025 COMPLETE CBC W/AUTO DIFF WBC: CPT

## 2025-06-26 LAB
ESTIMATED AVERAGE GLUCOSE: 114 MG/DL
HBA1C MFR BLD: 5.6 % (ref 4–6)
THYROPEROXIDASE IGG SERPL-ACNC: 223 IU/ML (ref 0–25)

## 2025-07-06 ENCOUNTER — RESULTS FOLLOW-UP (OUTPATIENT)
Age: 60
End: 2025-07-06

## 2025-07-06 DIAGNOSIS — E06.3 HASHIMOTO THYROIDITIS: Primary | ICD-10-CM

## 2025-07-06 RX ORDER — LEVOTHYROXINE SODIUM 25 UG/1
25 TABLET ORAL DAILY
Qty: 30 TABLET | Refills: 1 | Status: SHIPPED | OUTPATIENT
Start: 2025-07-06 | End: 2025-08-04

## 2025-07-08 ENCOUNTER — TELEPHONE (OUTPATIENT)
Dept: GASTROENTEROLOGY | Age: 60
End: 2025-07-08

## 2025-07-08 ENCOUNTER — TELEPHONE (OUTPATIENT)
Age: 60
End: 2025-07-08

## 2025-07-08 NOTE — TELEPHONE ENCOUNTER
Called [facility] Colonoscopy scheduling LMOM.Please call [phone] ext [phone] schedule colon screening .

## 2025-07-10 ENCOUNTER — HOSPITAL ENCOUNTER (OUTPATIENT)
Dept: LAB | Age: 60
Discharge: HOME | End: 2025-07-10
Payer: COMMERCIAL

## 2025-07-10 DIAGNOSIS — E06.3 HASHIMOTO THYROIDITIS: ICD-10-CM

## 2025-07-10 LAB
T4 FREE SERPL-MCNC: 0.63 NG/DL (ref 0.84–1.68)
TSH SERPL-MCNC: 49.75 UIU/ML (ref 0.44–3.86)

## 2025-07-10 PROCEDURE — 84443 ASSAY THYROID STIM HORMONE: CPT

## 2025-07-10 PROCEDURE — 84439 ASSAY OF FREE THYROXINE: CPT

## 2025-07-10 PROCEDURE — 36415 COLL VENOUS BLD VENIPUNCTURE: CPT

## 2025-07-16 ENCOUNTER — HOSPITAL ENCOUNTER (OUTPATIENT)
Dept: NEUROLOGY | Age: 60
Discharge: HOME | End: 2025-07-16
Payer: COMMERCIAL

## 2025-07-16 ENCOUNTER — HOSPITAL ENCOUNTER (OUTPATIENT)
Dept: ULTRASOUND IMAGING | Age: 60
Discharge: HOME | End: 2025-07-18
Payer: COMMERCIAL

## 2025-07-16 DIAGNOSIS — R20.0 NUMBNESS OF RIGHT ANTERIOR THIGH: ICD-10-CM

## 2025-07-16 DIAGNOSIS — M79.651 RIGHT THIGH PAIN: ICD-10-CM

## 2025-07-16 PROCEDURE — 95910 NRV CNDJ TEST 7-8 STUDIES: CPT

## 2025-07-16 PROCEDURE — 93971 EXTREMITY STUDY: CPT

## 2025-07-16 PROCEDURE — 95886 MUSC TEST DONE W/N TEST COMP: CPT

## 2025-07-16 NOTE — PROCEDURES
[facility]                   [address] [place] [address]                             ELECTROMYOGRAM      PATIENT NAME: MARIELA RUIZ          : 1965  MED REC NO: [id number]                        ROOM:   ACCOUNT NO: [id number]                       ADMIT DATE: 2025  PROVIDER: Charles Conroy MD      REFERRING PHYSICIAN:  Dr. Leos    REASON FOR THE STUDY:  The patient has a history of low back pain, mostly on the left side.  She had numbness and discomfort in the right thigh on the anterior lateral aspect.    Motor nerve conduction velocities are normal in all the nerves tested.    F-wave latencies are normal in the peroneal nerves, but delayed in the tibial nerves bilaterally.    The distal motor and sensory latencies are normal in all the nerves tested.    On the concentric needle electrode examination, mild denervation changes are apparent in the L5-S1 root distribution bilaterally.    CLINICAL INTERPRETATION:    1. Mild bilateral L5-S1 radiculopathy.  Her pain is mostly in the left lower back.  2. The patient is doing well with her exercise program, Tylenol and ibuprofen.  3.  Weight contro, l physical therapy may help this patient.  4. Meralgia paresthetica on the right side which is bothersome when she is sitting or standing.  For the paresthesias, she could be tried on medications such as topiramate, Trileptal, gabapentin, etc.  5. If clinically indicated, we could repeat the study in a year.    Thank you, Dr. Leos, for allowing me to see this patient.  Please feel free to call me if I can be of any further assistance regarding this patient's evaluation.          CHARLES CONROY MD      D:  2025 14:35:13     T:  2025 15:41:09     DM/AQS  Job #:  [id number]     Doc#:  [id number]

## 2025-07-29 ENCOUNTER — TELEPHONE (OUTPATIENT)
Age: 60
End: 2025-07-29

## 2025-07-29 DIAGNOSIS — R20.0 NUMBNESS OF RIGHT ANTERIOR THIGH: Primary | ICD-10-CM

## 2025-07-29 DIAGNOSIS — M54.16 LUMBAR RADICULOPATHY: ICD-10-CM

## 2025-08-03 DIAGNOSIS — E06.3 HASHIMOTO THYROIDITIS: ICD-10-CM

## 2025-08-04 RX ORDER — LEVOTHYROXINE SODIUM 25 MCG
25 TABLET ORAL DAILY
Qty: 90 TABLET | Refills: 1 | Status: SHIPPED | OUTPATIENT
Start: 2025-08-04 | End: 2025-08-07

## 2025-08-06 ENCOUNTER — HOSPITAL ENCOUNTER (OUTPATIENT)
Dept: LAB | Age: 60
Discharge: HOME | End: 2025-08-06
Payer: COMMERCIAL

## 2025-08-06 DIAGNOSIS — E06.3 HASHIMOTO THYROIDITIS: ICD-10-CM

## 2025-08-06 LAB
T4 FREE SERPL-MCNC: 0.75 NG/DL (ref 0.84–1.68)
TSH SERPL-MCNC: 43.26 UIU/ML (ref 0.44–3.86)

## 2025-08-06 PROCEDURE — 36415 COLL VENOUS BLD VENIPUNCTURE: CPT

## 2025-08-06 PROCEDURE — 84443 ASSAY THYROID STIM HORMONE: CPT

## 2025-08-06 PROCEDURE — 84439 ASSAY OF FREE THYROXINE: CPT

## 2025-08-18 ENCOUNTER — TELEPHONE (OUTPATIENT)
Age: 60
End: 2025-08-18

## (undated) DEVICE — NEPTUNE E-SEP SMOKE EVACUATION PENCIL, COATED, 70MM BLADE, PUSH BUTTON SWITCH: Brand: NEPTUNE E-SEP

## (undated) DEVICE — C-ARM: Brand: UNBRANDED

## (undated) DEVICE — SYRINGE IRRIG 60ML SFT PLIABLE BLB EZ TO GRP 1 HND USE W/

## (undated) DEVICE — SPONGE GZ W4XL4IN COT 12 PLY TYP VII WVN C FLD DSGN

## (undated) DEVICE — GLOVE ORANGE PI 7   MSG9070

## (undated) DEVICE — GLOVE ORANGE PI 7 1/2   MSG9075

## (undated) DEVICE — COTTON UNDERCAST PADDING,REGULAR FINISH: Brand: WEBRIL

## (undated) DEVICE — Device

## (undated) DEVICE — ZIMMER® STERILE DISPOSABLE TOURNIQUET CUFF, DUAL PORT, SINGLE BLADDER, 18 IN. (46 CM)

## (undated) DEVICE — GOWN,AURORA,NONRNF,XL,30/CS: Brand: MEDLINE

## (undated) DEVICE — ELECTRODE PT RET AD L9FT HI MOIST COND ADH HYDRGEL CORDED

## (undated) DEVICE — HAND II: Brand: MEDLINE INDUSTRIES, INC.

## (undated) DEVICE — DRESSING PETRO W3XL8IN OIL EMUL N ADH GZ KNIT IMPREG CELOS

## (undated) DEVICE — TUBING, SUCTION, 1/4" X 10', STRAIGHT: Brand: MEDLINE

## (undated) DEVICE — BANDAGE COMPR W4INXL5YD WHT BGE POLY COT M E WRP WV HK AND

## (undated) DEVICE — APPLICATOR MEDICATED 26 CC SOLUTION HI LT ORNG CHLORAPREP

## (undated) DEVICE — PADDING UNDERCAST W4INXL12FT RAYON POLY SYN NONADHESIVE

## (undated) DEVICE — 1010 S-DRAPE TOWEL DRAPE 10/BX: Brand: STERI-DRAPE™

## (undated) DEVICE — SUTURE MCRYL SZ 4-0 L27IN ABSRB UD L19MM PS-2 1/2 CIR PRIM Y426H

## (undated) DEVICE — SUTURE MCRYL SZ 2-0 L36IN ABSRB UD L36MM CT-1 1/2 CIR Y945H